# Patient Record
Sex: FEMALE | Race: WHITE | Employment: UNEMPLOYED | URBAN - METROPOLITAN AREA
[De-identification: names, ages, dates, MRNs, and addresses within clinical notes are randomized per-mention and may not be internally consistent; named-entity substitution may affect disease eponyms.]

---

## 2017-11-02 ENCOUNTER — HOSPITAL ENCOUNTER (EMERGENCY)
Age: 57
Discharge: HOME | End: 2017-11-02
Attending: EMERGENCY MEDICINE
Payer: COMMERCIAL

## 2017-11-02 ENCOUNTER — APPOINTMENT (OUTPATIENT)
Dept: GENERAL RADIOLOGY | Age: 57
End: 2017-11-02
Attending: PHYSICIAN ASSISTANT
Payer: COMMERCIAL

## 2017-11-02 VITALS
OXYGEN SATURATION: 100 % | BODY MASS INDEX: 26.52 KG/M2 | HEIGHT: 66 IN | RESPIRATION RATE: 16 BRPM | DIASTOLIC BLOOD PRESSURE: 82 MMHG | WEIGHT: 165 LBS | TEMPERATURE: 97.6 F | HEART RATE: 73 BPM | SYSTOLIC BLOOD PRESSURE: 157 MMHG

## 2017-11-02 DIAGNOSIS — S20.212A CONTUSION OF RIB ON LEFT SIDE, INITIAL ENCOUNTER: Primary | ICD-10-CM

## 2017-11-02 DIAGNOSIS — N39.0 URINARY TRACT INFECTION WITHOUT HEMATURIA, SITE UNSPECIFIED: ICD-10-CM

## 2017-11-02 LAB
APPEARANCE UR: ABNORMAL
BACTERIA URNS QL MICRO: ABNORMAL /HPF
BILIRUB UR QL: NEGATIVE
COLOR UR: YELLOW
EPITH CASTS URNS QL MICRO: ABNORMAL /LPF (ref 0–5)
GLUCOSE UR STRIP.AUTO-MCNC: NEGATIVE MG/DL
HGB UR QL STRIP: NEGATIVE
KETONES UR QL STRIP.AUTO: NEGATIVE MG/DL
LEUKOCYTE ESTERASE UR QL STRIP.AUTO: ABNORMAL
NITRITE UR QL STRIP.AUTO: POSITIVE
PH UR STRIP: 6 [PH] (ref 5–8)
PROT UR STRIP-MCNC: NEGATIVE MG/DL
RBC #/AREA URNS HPF: NEGATIVE /HPF (ref 0–5)
SP GR UR REFRACTOMETRY: 1.02 (ref 1–1.03)
UROBILINOGEN UR QL STRIP.AUTO: 1 EU/DL (ref 0.2–1)
WBC URNS QL MICRO: ABNORMAL /HPF (ref 0–5)

## 2017-11-02 PROCEDURE — 87186 SC STD MICRODIL/AGAR DIL: CPT | Performed by: PHYSICIAN ASSISTANT

## 2017-11-02 PROCEDURE — 87077 CULTURE AEROBIC IDENTIFY: CPT | Performed by: PHYSICIAN ASSISTANT

## 2017-11-02 PROCEDURE — 87086 URINE CULTURE/COLONY COUNT: CPT | Performed by: PHYSICIAN ASSISTANT

## 2017-11-02 PROCEDURE — 71101 X-RAY EXAM UNILAT RIBS/CHEST: CPT

## 2017-11-02 PROCEDURE — 99283 EMERGENCY DEPT VISIT LOW MDM: CPT

## 2017-11-02 PROCEDURE — 81001 URINALYSIS AUTO W/SCOPE: CPT | Performed by: PHYSICIAN ASSISTANT

## 2017-11-02 RX ORDER — OXYCODONE HYDROCHLORIDE 5 MG/1
5 CAPSULE ORAL
COMMUNITY
End: 2018-09-04

## 2017-11-02 RX ORDER — PHENAZOPYRIDINE HYDROCHLORIDE 200 MG/1
200 TABLET, FILM COATED ORAL 3 TIMES DAILY
Qty: 6 TAB | Refills: 0 | Status: SHIPPED | OUTPATIENT
Start: 2017-11-02 | End: 2017-11-04

## 2017-11-02 RX ORDER — SULFAMETHOXAZOLE AND TRIMETHOPRIM 800; 160 MG/1; MG/1
1 TABLET ORAL 2 TIMES DAILY
Qty: 6 TAB | Refills: 0 | Status: SHIPPED | OUTPATIENT
Start: 2017-11-02 | End: 2017-11-05

## 2017-11-02 RX ORDER — CYCLOBENZAPRINE HCL 5 MG
5 TABLET ORAL
Qty: 15 TAB | Refills: 0 | Status: SHIPPED | OUTPATIENT
Start: 2017-11-02 | End: 2018-09-04

## 2017-11-02 RX ORDER — IBUPROFEN 800 MG/1
800 TABLET ORAL
Qty: 20 TAB | Refills: 0 | Status: SHIPPED | OUTPATIENT
Start: 2017-11-02 | End: 2017-11-09

## 2017-11-02 NOTE — ED TRIAGE NOTES
Patient states did this before. States she fell asleep out of chair and she hit her knee and then she hit her back.  States this happened last night

## 2017-11-02 NOTE — ED PROVIDER NOTES
HPI Comments: 8:08 PM   April Gilmer is a 62 y.o. female presenting to the ED C/O an episode of \"falling asleep\" while sitting up in a chair, associated with a concurrent fall that resulted in bilateral knee pain, and head pain. Pt. States that this has happened once before that \"banged her up pretty bad\", but she states this one was worse. The patient has taken an Oxycodone this morning with little relief to the Sx. Pt denies fevers, chills, hitting her head, abdominal pain, and any other symptoms or complaints. Written by WENDY Monique, as dictated by Shar Sparrow PA-C      Patient is a 62 y.o. female presenting with fall. The history is provided by the patient. No  was used. Fall   The accident occurred 12 to 24 hours ago (lastnight). She fell from a height of 3 - 5 ft. Pertinent negatives include no fever and no abdominal pain. Past Medical History:   Diagnosis Date    Arrhythmia     hx of afib-due to stress and history of tachycardia    Arthritis     osteo-both hands, back    CAD (coronary artery disease)     Pt reports that in the past EKG shows that she had a heart attack-but all testing was neg    Chronic pain     lower back    Narcolepsy     Nausea & vomiting     Restless legs syndrome        Past Surgical History:   Procedure Laterality Date    HX  SECTION      x 2    HX GASTRIC BYPASS      gastric sleeve    HX GYN      ectopic preg    HX OTHER SURGICAL      gastric sleeve    HX SEPTOPLASTY      HX TUBAL LIGATION      NEUROLOGICAL PROCEDURE UNLISTED      back surgery x 2    NEUROLOGICAL PROCEDURE UNLISTED      neck surgery         Family History:   Problem Relation Age of Onset    Family history unknown: Yes       Social History     Social History    Marital status:      Spouse name: N/A    Number of children: N/A    Years of education: N/A     Occupational History    Not on file.      Social History Main Topics    Smoking status: Former Smoker     Quit date: 1/25/2004    Smokeless tobacco: Never Used    Alcohol use 4.8 oz/week     1 Shots of liquor, 7 Glasses of wine per week      Comment: daily    Drug use: No    Sexual activity: No     Other Topics Concern    Not on file     Social History Narrative         ALLERGIES: Keflex [cephalexin]    Review of Systems   Constitutional: Negative for chills and fever. Gastrointestinal: Negative for abdominal pain. Musculoskeletal: Positive for back pain. Bilateral knee pain     All other systems reviewed and are negative. Vitals:    11/02/17 1854   BP: 157/82   Pulse: 73   Resp: 16   Temp: 97.6 °F (36.4 °C)   SpO2: 100%   Weight: 74.8 kg (165 lb)   Height: 5' 6\" (1.676 m)            Physical Exam   Constitutional: She is oriented to person, place, and time. She appears well-developed and well-nourished. No distress. Sitting up on stretcher, appears tired    HENT:   Head: Normocephalic and atraumatic. Neck: Normal range of motion. Neck supple. Cardiovascular: Normal rate, regular rhythm, normal heart sounds and intact distal pulses. No murmur heard. Pulmonary/Chest: Effort normal and breath sounds normal. No accessory muscle usage. No respiratory distress. She has no decreased breath sounds. She has no wheezes. She has no rhonchi. She has no rales. She exhibits tenderness. She exhibits no mass, no bony tenderness, no laceration, no crepitus, no edema, no deformity, no swelling and no retraction. Abdominal: Soft. Bowel sounds are normal. She exhibits no distension. There is no hepatosplenomegaly. There is no tenderness. There is no rigidity, no rebound, no guarding and no CVA tenderness. Neurological: She is alert and oriented to person, place, and time. Skin: Skin is warm and dry. No rash noted. No erythema. No pallor. Psychiatric: She has a normal mood and affect. Judgment normal.   Nursing note and vitals reviewed.      RESULTS:    CARDIAC MONITOR NOTE:  Cardiac Rhythm: NSR  Rate: 73 bpm     PULSE OXIMETRY NOTE:  Pulse-ox is 100% on room air  Interpretation: normal       XR RIBS LT W PA CXR MIN 3 V   Final Result      Question of left second rib fracture correlate clinically with site of  tenderness.     No pneumothorax or acute process  As read by the radiologist.      Labs Reviewed   URINALYSIS W/ RFLX MICROSCOPIC - Abnormal; Notable for the following:        Result Value    Nitrites POSITIVE (*)     Leukocyte Esterase MODERATE (*)     All other components within normal limits   URINE MICROSCOPIC ONLY - Abnormal; Notable for the following:     Bacteria 4+ (*)     All other components within normal limits   CULTURE, URINE       Recent Results (from the past 12 hour(s))   URINALYSIS W/ RFLX MICROSCOPIC    Collection Time: 11/02/17  8:15 PM   Result Value Ref Range    Color YELLOW      Appearance CLOUDY      Specific gravity 1.023 1.005 - 1.030      pH (UA) 6.0 5.0 - 8.0      Protein NEGATIVE  NEG mg/dL    Glucose NEGATIVE  NEG mg/dL    Ketone NEGATIVE  NEG mg/dL    Bilirubin NEGATIVE  NEG      Blood NEGATIVE  NEG      Urobilinogen 1.0 0.2 - 1.0 EU/dL    Nitrites POSITIVE (A) NEG      Leukocyte Esterase MODERATE (A) NEG     URINE MICROSCOPIC ONLY    Collection Time: 11/02/17  8:15 PM   Result Value Ref Range    WBC 30 to 40 0 - 5 /hpf    RBC NEGATIVE  0 - 5 /hpf    Epithelial cells FEW 0 - 5 /lpf    Bacteria 4+ (A) NEG /hpf        MDM  Number of Diagnoses or Management Options  Contusion of rib on left side, initial encounter:   Urinary tract infection without hematuria, site unspecified:   Diagnosis management comments: Rib fx, contusion, ptx, kidney injury     ED Course     Medications - No data to display   Procedures      PROGRESS NOTE:  8:08 Pm  Initial assessment performed. Written by Oniel Mina, ED Scribe, as dictated by Emerita Milian PA-C     DISCUSSION:  Informed opt of all results.  No pain over the 2nd rib. sts she has had urinary sxs but never sought tx. Now c/o left sided neck pain, mild spasm of muscles, no midline tenderness, no crepitus or step off. sts she thinks its just stiff. No imaging indicated at this time. Non toxic appearance, afebrile. Will tx with Bactrim pending urine culture. Advised to use incentive spirometer that she has at home. DISCHARGE NOTE:  11:37 PM   Ca Rodriguez's  results have been reviewed with her. She has been counseled regarding her diagnosis, treatment, and plan. She verbally conveys understanding and agreement of the signs, symptoms, diagnosis, treatment and prognosis and additionally agrees to follow up as discussed. She also agrees with the care-plan and conveys that all of her questions have been answered. I have also provided discharge instructions for her that include: educational information regarding their diagnosis and treatment, and list of reasons why they would want to return to the ED prior to their follow-up appointment, should her condition change. The patient and/or family has been provided with education for proper Emergency Department utilization. CLINICAL IMPRESSION:    1. Contusion of rib on left side, initial encounter    2. Urinary tract infection without hematuria, site unspecified        PLAN: DISCHARGE HOME    Follow-up Information     Follow up With Details Comments Contact Info    Romana Jurado MD Schedule an appointment as soon as possible for a visit in 2 days ED Follow-up Audra Garcia  [phone] [facility] EMERGENCY DEPT Go to As needed, If symptoms worsen [address] Makenzie Schmidt  [phone]          Discharge Medication List as of 11/2/2017 11:37 PM      START taking these medications    Details   trimethoprim-sulfamethoxazole (BACTRIM DS) 160-800 mg per tablet Take 1 Tab by mouth two (2) times a day for 3 days. , Normal, Disp-6 Tab, R-0      phenazopyridine (PYRIDIUM) 200 mg tablet Take 1 Tab by mouth three (3) times daily for 2 days., Normal, Disp-6 Tab, R-0      ibuprofen (MOTRIN) 800 mg tablet Take 1 Tab by mouth every six (6) hours as needed for Pain for up to 7 days. , Normal, Disp-20 Tab, R-0      cyclobenzaprine (FLEXERIL) 5 mg tablet Take 1 Tab by mouth three (3) times daily as needed for Muscle Spasm(s). , Normal, Disp-15 Tab, R-0         CONTINUE these medications which have NOT CHANGED    Details   oxyCODONE (OXYIR) 5 mg capsule Take 5 mg by mouth every four (4) hours as needed., Historical Med      ROPINIROLE HCL (REQUIP PO) Take 12 mg by mouth daily. Indications: restless leg syndrome, Historical Med             ATTESTATIONS:  This note is prepared by Richie Ruff, acting as Scribe for Connie Toscano PA-C. Connie Toscano PA-C: The scribe's documentation has been prepared under my direction and personally reviewed by me in its entirety. I confirm that the note above accurately reflects all work, treatment, procedures, and medical decision making performed by me.

## 2017-11-03 NOTE — DISCHARGE INSTRUCTIONS
Chest Contusion: Care Instructions  Your Care Instructions  A chest contusion, or bruise, is caused by a fall or direct blow to the chest. Car crashes, falls, getting punched, and injury from bicycle handlebars are common causes of chest contusions. A very forceful blow to the chest can injure the heart or blood vessels in the chest, the lungs, the airway, the liver, or the spleen. Pain may be caused by an injury to muscles, cartilage, or ribs. Deep breathing, coughing, or sneezing can increase your pain. Lying on the injured area also can cause pain. Follow-up care is a key part of your treatment and safety. Be sure to make and go to all appointments, and call your doctor if you are having problems. It's also a good idea to know your test results and keep a list of the medicines you take. How can you care for yourself at home? · Rest and protect the injured or sore area. Stop, change, or take a break from any activity that may be causing your pain. · Put ice or a cold pack on the area for 10 to 20 minutes at a time. Put a thin cloth between the ice and your skin. · After 2 or 3 days, if your swelling is gone, apply a heating pad set on low or a warm cloth to your chest. Some doctors suggest that you go back and forth between hot and cold. Put a thin cloth between the heating pad and your skin. · Do not wrap or tape your ribs for support. This may cause you to take smaller breaths, which could increase your risk of pneumonia and lung collapse. · Ask your doctor if you can take an over-the-counter pain medicine, such as acetaminophen (Tylenol), ibuprofen (Advil, Motrin), or naproxen (Aleve). Be safe with medicines. Read and follow all instructions on the label. · Take your medicines exactly as prescribed. Call your doctor if you think you are having a problem with your medicine.   · Gentle stretching and massage may help you feel better after a few days of rest. Stretch slowly to the point just before discomfort begins, then hold the stretch for at least 15 to 30 seconds. Do this 3 or 4 times per day. · As your pain gets better, slowly return to your normal activities. Be patient, because chest bruises can take weeks or months to heal. Any increased pain may be a sign that you need to rest a while longer. When should you call for help? Call 911 anytime you think you may need emergency care. For example, call if:  ? · You have severe trouble breathing. ? · You cough up blood. ?Call your doctor now or seek immediate medical care if:  ? · You have belly pain. ? · You are dizzy or lightheaded, or you feel like you may faint. ? · You develop new symptoms with the chest pain. ? · Your chest pain gets worse. ? · You have a fever. ? · You have some shortness of breath. ? · You have a cough that brings up mucus from the lungs. ? Watch closely for changes in your health, and be sure to contact your doctor if:  ? · Your chest pain is not improving after 1 week. Where can you learn more? Go to http://janiya-jessika.info/. Enter I174 in the search box to learn more about \"Chest Contusion: Care Instructions. \"  Current as of: March 20, 2017  Content Version: 11.4  © 3921-6909 [employer]. Care instructions adapted under license by [employer] (which disclaims liability or warranty for this information). If you have questions about a medical condition or this instruction, always ask your healthcare professional. [place] [address] any warranty or liability for your use of this information. Urinary Tract Infection in Women: Care Instructions  Your Care Instructions    A urinary tract infection, or UTI, is a general term for an infection anywhere between the kidneys and the urethra (where urine comes out). Most UTIs are bladder infections. They often cause pain or burning when you urinate.   UTIs are caused by bacteria and can be cured with antibiotics. Be sure to complete your treatment so that the infection goes away. Follow-up care is a key part of your treatment and safety. Be sure to make and go to all appointments, and call your doctor if you are having problems. It's also a good idea to know your test results and keep a list of the medicines you take. How can you care for yourself at home? · Take your antibiotics as directed. Do not stop taking them just because you feel better. You need to take the full course of antibiotics. · Drink extra water and other fluids for the next day or two. This may help wash out the bacteria that are causing the infection. (If you have kidney, heart, or liver disease and have to limit fluids, talk with your doctor before you increase your fluid intake.)  · Avoid drinks that are carbonated or have caffeine. They can irritate the bladder. · Urinate often. Try to empty your bladder each time. · To relieve pain, take a hot bath or lay a heating pad set on low over your lower belly or genital area. Never go to sleep with a heating pad in place. To prevent UTIs  · Drink plenty of water each day. This helps you urinate often, which clears bacteria from your system. (If you have kidney, heart, or liver disease and have to limit fluids, talk with your doctor before you increase your fluid intake.)  · Urinate when you need to. · Urinate right after you have sex. · Change sanitary pads often. · Avoid douches, bubble baths, feminine hygiene sprays, and other feminine hygiene products that have deodorants. · After going to the bathroom, wipe from front to back. When should you call for help? Call your doctor now or seek immediate medical care if:  ? · Symptoms such as fever, chills, nausea, or vomiting get worse or appear for the first time. ? · You have new pain in your back just below your rib cage. This is called flank pain. ? · There is new blood or pus in your urine.    ? · You have any problems with your antibiotic medicine. ? Watch closely for changes in your health, and be sure to contact your doctor if:  ? · You are not getting better after taking an antibiotic for 2 days. ? · Your symptoms go away but then come back. Where can you learn more? Go to http://janiya-jessika.info/. Enter J636 in the search box to learn more about \"Urinary Tract Infection in Women: Care Instructions. \"  Current as of: May 12, 2017  Content Version: 11.4  © 5044-0720 "[employer]". Care instructions adapted under license by [employer] (which disclaims liability or warranty for this information). If you have questions about a medical condition or this instruction, always ask your healthcare professional. [address] any warranty or liability for your use of this information.

## 2017-11-05 LAB
BACTERIA SPEC CULT: ABNORMAL
SERVICE CMNT-IMP: ABNORMAL

## 2018-09-04 RX ORDER — DEXTROAMPHETAMINE SACCHARATE, AMPHETAMINE ASPARTATE, DEXTROAMPHETAMINE SULFATE AND AMPHETAMINE SULFATE 7.5; 7.5; 7.5; 7.5 MG/1; MG/1; MG/1; MG/1
30 TABLET ORAL
COMMUNITY

## 2018-09-04 RX ORDER — OMEPRAZOLE 20 MG/1
20 CAPSULE, DELAYED RELEASE ORAL DAILY
COMMUNITY

## 2018-09-04 NOTE — PROGRESS NOTES
PT aware of need to hold anticoagulants per protocol. Pt denies. PT aware of need for  at discharge. PT aware of arrival time pre procedure. Arrive at [facility] radiology waiting room on 9/14/18 at 1000 for scheduled procedure to occur at 1100. Pt states no questions at this time. Gave pt [facility] radiology rn phone number [phone].

## 2018-09-14 ENCOUNTER — HOSPITAL ENCOUNTER (OUTPATIENT)
Dept: GENERAL RADIOLOGY | Age: 58
Discharge: HOME | End: 2018-09-14
Attending: ORTHOPAEDIC SURGERY

## 2018-09-28 NOTE — PROGRESS NOTES
PT aware of need for  at discharge. PT aware of arrival time pre procedure. Pt states no questions at this time. Number left for any additional questions.

## 2018-10-04 ENCOUNTER — HOSPITAL ENCOUNTER (OUTPATIENT)
Dept: GENERAL RADIOLOGY | Age: 58
Discharge: HOME | End: 2018-10-04
Attending: RADIOLOGY | Admitting: RADIOLOGY
Payer: COMMERCIAL

## 2018-10-04 ENCOUNTER — APPOINTMENT (OUTPATIENT)
Dept: CT IMAGING | Age: 58
End: 2018-10-04
Attending: ORTHOPAEDIC SURGERY
Payer: COMMERCIAL

## 2018-10-04 VITALS
RESPIRATION RATE: 20 BRPM | SYSTOLIC BLOOD PRESSURE: 104 MMHG | BODY MASS INDEX: 28.25 KG/M2 | HEART RATE: 60 BPM | DIASTOLIC BLOOD PRESSURE: 57 MMHG | HEIGHT: 67 IN | OXYGEN SATURATION: 97 % | WEIGHT: 180 LBS | TEMPERATURE: 97.4 F

## 2018-10-04 DIAGNOSIS — M54.50 LOW BACK PAIN: ICD-10-CM

## 2018-10-04 PROCEDURE — 62304 MYELOGRAPHY LUMBAR INJECTION: CPT

## 2018-10-04 PROCEDURE — 72132 CT LUMBAR SPINE W/DYE: CPT

## 2018-10-04 PROCEDURE — 74011636320 HC RX REV CODE- 636/320: Performed by: RADIOLOGY

## 2018-10-04 PROCEDURE — 74011250636 HC RX REV CODE- 250/636

## 2018-10-04 PROCEDURE — 74011250637 HC RX REV CODE- 250/637: Performed by: RADIOLOGY

## 2018-10-04 RX ORDER — HYDROCODONE BITARTRATE AND ACETAMINOPHEN 5; 325 MG/1; MG/1
2 TABLET ORAL
Status: DISCONTINUED | OUTPATIENT
Start: 2018-10-04 | End: 2018-10-04 | Stop reason: HOSPADM

## 2018-10-04 RX ORDER — LIDOCAINE HYDROCHLORIDE 10 MG/ML
INJECTION, SOLUTION EPIDURAL; INFILTRATION; INTRACAUDAL; PERINEURAL
Status: COMPLETED
Start: 2018-10-04 | End: 2018-10-04

## 2018-10-04 RX ORDER — LIDOCAINE HYDROCHLORIDE 10 MG/ML
1-10 INJECTION, SOLUTION EPIDURAL; INFILTRATION; INTRACAUDAL; PERINEURAL
Status: COMPLETED | OUTPATIENT
Start: 2018-10-04 | End: 2018-10-04

## 2018-10-04 RX ORDER — ACETAMINOPHEN 325 MG/1
650 TABLET ORAL
Status: DISCONTINUED | OUTPATIENT
Start: 2018-10-04 | End: 2018-10-04 | Stop reason: HOSPADM

## 2018-10-04 RX ORDER — AMMONIA 15 % (W/V)
1 AMPUL (EA) INHALATION
Status: DISCONTINUED | OUTPATIENT
Start: 2018-10-04 | End: 2018-10-04 | Stop reason: HOSPADM

## 2018-10-04 RX ADMIN — LIDOCAINE HYDROCHLORIDE 5 ML: 10 INJECTION, SOLUTION EPIDURAL; INFILTRATION; INTRACAUDAL; PERINEURAL at 11:59

## 2018-10-04 RX ADMIN — IOPAMIDOL 17 ML: 408 INJECTION, SOLUTION INTRATHECAL at 12:00

## 2018-10-04 RX ADMIN — HYDROCODONE BITARTRATE AND ACETAMINOPHEN 2 TABLET: 5; 325 TABLET ORAL at 13:27

## 2018-10-04 NOTE — H&P
..      The patient is an appropriate candidate to undergo LP for Ct Myelogram      History and Physical update:  H&P was reviewed and the patient was evaluated. No changes have occurred in the patient's condition since the H&P was completed.     Jolanta Varghese MD  Vascular & Interventional Radiology  [facility]  10/4/2018

## 2018-10-04 NOTE — PROGRESS NOTES
Pt is all prepped and ready for procedure. 1 Pt had an uneventful recovery, was medicated @   1330 for c/o HA 5/10, two percocets was adm and was effective. Pt slept well the rest of the recovery time. Discharge instructions reviewed and understandings verbalized well. Pt escorted to car via W/C and left with spouse in stabble condition with no complains voiced.

## 2018-10-04 NOTE — IP AVS SNAPSHOT
[facility] 
 
 
 [address] [address] 
[phone] Patient: Kristyn Godinez MRN: [id number] GW About your hospitalization You were admitted on:  2018 You last received care in the:  [address] You were discharged on:  2018 Why you were hospitalized Your primary diagnosis was:  Not on File Follow-up Information Follow up With Details Comments Contact Info Laura Sullivan MD   [address] 
[place] [address] 
[phone] Discharge Orders None A check erich indicates which time of day the medication should be taken. My Medications ASK your doctor about these medications Instructions Each Dose to Equal  
 Morning Noon Evening Bedtime ADDERALL 30 mg tablet Generic drug:  dextroamphetamine-amphetamine Your last dose was: Your next dose is: Take 30 mg by mouth. 30 mg  
    
   
   
   
  
 omeprazole 20 mg capsule Commonly known as:  PRILOSEC Your last dose was: Your next dose is: Take 20 mg by mouth daily. 20 mg  
    
   
   
   
  
 REQUIP PO Your last dose was: Your next dose is: Take 12 mg by mouth daily. Indications: restless leg syndrome 12 mg Discharge Instructions Back Pain: Care Instructions Your Care Instructions Back pain has many possible causes. It is often related to problems with muscles and ligaments of the back. It may also be related to problems with the nerves, discs, or bones of the back. Moving, lifting, standing, sitting, or sleeping in an awkward way can strain the back. Sometimes you don't notice the injury until later. Arthritis is another common cause of back pain.  
Although it may hurt a lot, back pain usually improves on its own within several weeks. Most people recover in 12 weeks or less. Using good home treatment and being careful not to stress your back can help you feel better sooner. Follow-up care is a key part of your treatment and safety. Be sure to make and go to all appointments, and call your doctor if you are having problems. It's also a good idea to know your test results and keep a list of the medicines you take. How can you care for yourself at home? · Sit or lie in positions that are most comfortable and reduce your pain. Try one of these positions when you lie down: ¨ Lie on your back with your knees bent and supported by large pillows. ¨ Lie on the floor with your legs on the seat of a sofa or chair. Roney Ek on your side with your knees and hips bent and a pillow between your legs. ¨ Lie on your stomach if it does not make pain worse. · Do not sit up in bed, and avoid soft couches and twisted positions. Bed rest can help relieve pain at first, but it delays healing. Avoid bed rest after the first day of back pain. · Change positions every 30 minutes. If you must sit for long periods of time, take breaks from sitting. Get up and walk around, or lie in a comfortable position. · Try using a heating pad on a low or medium setting for 15 to 20 minutes every 2 or 3 hours. Try a warm shower in place of one session with the heating pad. · You can also try an ice pack for 10 to 15 minutes every 2 to 3 hours. Put a thin cloth between the ice pack and your skin. · Take pain medicines exactly as directed. ¨ If the doctor gave you a prescription medicine for pain, take it as prescribed. ¨ If you are not taking a prescription pain medicine, ask your doctor if you can take an over-the-counter medicine. · Take short walks several times a day. You can start with 5 to 10 minutes, 3 or 4 times a day, and work up to longer walks. Walk on level surfaces and avoid hills and stairs until your back is better. · Return to work and other activities as soon as you can. Continued rest without activity is usually not good for your back. · To prevent future back pain, do exercises to stretch and strengthen your back and stomach. Learn how to use good posture, safe lifting techniques, and proper body mechanics. When should you call for help? Call your doctor now or seek immediate medical care if: 
  · You have new or worsening numbness in your legs.  
  · You have new or worsening weakness in your legs. (This could make it hard to stand up.)  
  · You lose control of your bladder or bowels.  
 Watch closely for changes in your health, and be sure to contact your doctor if: 
  · You have a fever, lose weight, or don't feel well.  
  · You do not get better as expected. Where can you learn more? Go to http://janiya-jessika.info/. Enter S730 in the search box to learn more about \"Back Pain: Care Instructions. \" Current as of: November 29, 2017 Content Version: 11.8 © 1284-1873 [employer]. Care instructions adapted under license by [employer] (which disclaims liability or warranty for this information). If you have questions about a medical condition or this instruction, always ask your healthcare professional. [place] [address] any warranty or liability for your use of this information. Myelogram: About This Test 
What is it? A myelogram uses X-rays and a special dye to make pictures of bones and nerves of the spine (spinal canal). The spinal canal holds the spinal cord, the spinal nerve roots, and the fluid-filled space between the bones in your spine. Why is this test done? A myelogram is done to check for: · The cause of arm or leg numbness, weakness, or pain. · Narrowing of the spinal canal (spinal stenosis). · A tumor or infection causing problems with the spinal cord or nerve roots. · A spinal disc that has ruptured (herniated disc). · Inflammation of the membrane that covers the brain and spinal cord. · Problems with the blood vessels to the spine. How can you prepare for the test? 
Your doctor will tell you if you need to change how much you eat and drink before the myelogram. You may be asked to increase the amount of water you drink before the test. Follow the instructions your doctor gives you about eating and drinking. Before a myelogram, tell your doctor if: 
· You are allergic to any medicines, contrast material, or iodine dye. · You have had bleeding problems, or you take a blood thinner, such as aspirin, clopidogrel (Plavix), or warfarin (Coumadin), or you take over-the-counter medicines, such as ibuprofen (Advil, Motrin) or naproxen (Aleve). Your doctor will tell you when you should stop taking these medicines several days before your procedure. Make sure that you understand exactly what he or she wants you to do. · You have had kidney problems. · You have diabetes, especially if you take metformin (Glucophage, for example). · You are or might be pregnant. Ask someone to take you home and stay with you after the test. 
What happens during the test? 
The dye is put into your spinal canal with a thin needle. This is called a lumbar puncture. The dye moves through the space so the nerve roots and spinal cord can be seen more clearly. After the dye is put in, you will lie still while the X-ray pictures are taken. How does it feel? You will feel a quick sting from a small needle that has medicine to numb the skin on your back. You will also feel some pressure as the long, thin spinal needle is put into your spinal canal. You may feel a quick sharp pain down your buttock or leg when the needle is moved in your spine. The dye may make you feel warm and flushed and have a metallic taste in your mouth.  
What else should you know about the test? 
In rare cases, the hole made by the needle in the sac around the spine does not close normally. This can allow spinal fluid to leak out. This leak may need to be repaired through a procedure called an epidural blood patch. To do the patch, your doctor injects some of your own blood to cover the hole. How long does the test take? · A myelogram usually takes 30 minutes to 1 hour. What happens after the test? 
· You will probably be able to go home 30 minutes to 2 hours after the test. 
· You may need to lie in bed with your head raised for 4 to 24 hours after the test. To prevent seizures, which are a rare side effect, do not bend over or lie down with your head lower than your body. Keeping your head higher than your body after a myelogram also may help prevent or reduce other side effects, such as headache, nausea, or vomiting. · Avoid strenuous activity, such as running or heavy lifting, for at least 1 day after your myelogram. 
· Drink plenty of water after the myelogram. Your doctor will give you instructions on taking your regular medicines. When should you call for help? Call 911 anytime you think you may need emergency care. For example, call if: 
· You have a seizure. Call your doctor now or seek immediate medical care if: 
· You have any increase in pain, weakness, or numbness in your legs. · You have a severe headache or stiff neck, or your eyes become very sensitive to light. · You have a headache that lasts longer than 24 hours. · You have problems urinating or having a bowel movement. · You have a fever. Follow-up care is a key part of your treatment and safety. Be sure to make and go to all appointments, and call your doctor if you are having problems. It's also a good idea to keep a list of the medicines you take. Ask your doctor when you can expect to have your test results. Where can you learn more? Go to http://janiya-jessika.info/.  
Enter X008 in the search box to learn more about \"Myelogram: About This Test.\" 
 Current as of: June 26, 2018 Content Version: 11.8 © 7271-4217 [employer]. Care instructions adapted under license by [employer] (which disclaims liability or warranty for this information). If you have questions about a medical condition or this instruction, always ask your healthcare professional. [address] any warranty or liability for your use of this information. DISCHARGE SUMMARY from Nurse PATIENT INSTRUCTIONS: 
 
 
F-face looks uneven A-arms unable to move or move unevenly S-speech slurred or non-existent T-time-call 911 as soon as signs and symptoms begin-DO NOT go Back to bed or wait to see if you get better-TIME IS BRAIN. Warning Signs of HEART ATTACK Call 911 if you have these symptoms: 
? Chest discomfort. Most heart attacks involve discomfort in the center of the chest that lasts more than a few minutes, or that goes away and comes back. It can feel like uncomfortable pressure, squeezing, fullness, or pain. ? Discomfort in other areas of the upper body. Symptoms can include pain or discomfort in one or both arms, the back, neck, jaw, or stomach. ? Shortness of breath with or without chest discomfort. ? Other signs may include breaking out in a cold sweat, nausea, or lightheadedness. Don't wait more than five minutes to call [address]! Fast action can save your life. Calling 911 is almost always the fastest way to get lifesaving treatment. Emergency Medical Services staff can begin treatment when they arrive  up to an hour sooner than if someone gets to the hospital by car. The discharge information has been reviewed with the patient and spouse. The patient and spouse verbalized understanding. Discharge medications reviewed with the patient and spouse and appropriate educational materials and side effects teaching were provided. Patient armband removed and shredded 
___________________________________________________________________________________________________________________________________ Introducing [facility] [place] [facility]! Dear Lars Seek: 
Thank you for requesting a [employer] account. Our records indicate that you already have an active [employer] account. You can access your account anytime at [email or web address] Did you know that you can access your hospital and ER discharge instructions at any time in [employer]? You can also review all of your test results from your hospital stay or ER visit. Additional Information If you have questions, please visit the Frequently Asked Questions section of the [employer] website at [email or web address] Remember, [employer] is NOT to be used for urgent needs. For medical emergencies, dial 911. Now available from your iPhone and Android! Introducing Harrison Red As a [employer] patient, I wanted to make you aware of our electronic visit tool called Harrison CurranCloudwise. [employer] 24/7 allows you to connect within minutes with a medical provider 24 hours a day, seven days a week via a mobile device or tablet or logging into a secure website from your computer. You can access Harrison Davidbalbirfin from anywhere in the [place]. A virtual visit might be right for you when you have a simple condition and feel like you just dont want to get out of bed, or cant get away from work for an appointment, when your regular [employer] provider is not available (evenings, weekends or holidays), or when youre out of town and need minor care.   Electronic visits cost only $49 and if the Harrison Red provider determines a prescription is needed to treat your condition, one can be electronically transmitted to a nearby pharmacy*. Please take a moment to enroll today if you have not already done so. The enrollment process is free and takes just a few minutes. To enroll, please download the [employer] 24/7 izzy to your tablet or phone, or visit [email or web address] to enroll on your computer. And, as an [address] [place] [address] patient with a [employer] account, the results of your visits will be scanned into your electronic medical record and your primary care provider will be able to view the scanned results. We urge you to continue to see your regular [employer] provider for your ongoing medical care. And while your primary care provider may not be the one available when you seek a Crystal ISbalbirfin virtual visit, the peace of mind you get from getting a real diagnosis real time can be priceless. For more information on [employer], view our Frequently Asked Questions (FAQs) at [email or web address]. Sincerely, 
 
Liam Fofana MD 
Chief Medical Officer [address] [place] [address] *:  certain medications cannot be prescribed via [employer] Providers Seen During Your Hospitalization Provider Specialty Primary office phone Renetta Pride MD Diagnostic Radiology [phone] Your Primary Care Physician (PCP) Primary Care Physician Office Phone Office Fax Darren Alberto 121 [phone] You are allergic to the following Allergen Reactions Keflex (Cephalexin) Other (comments) Caused vasculitis Recent Documentation Height Weight Breastfeeding? BMI OB Status Smoking Status 1.689 m 81.6 kg No 28.62 kg/m2 Postmenopausal Former Smoker Emergency Contacts Name Discharge Info Relation Home Work Mobile [facility] DISCHARGE CAREGIVER [3] Spouse [3] [phone] Patient Belongings The following personal items are in your possession at time of discharge: 
     Visual Aid: None Please provide this summary of care documentation to your next provider. Signatures-by signing, you are acknowledging that this After Visit Summary has been reviewed with you and you have received a copy. Patient Signature:  ____________________________________________________________ Date:  ____________________________________________________________  
  
Marthann Sniff Provider Signature:  ____________________________________________________________ Date:  ____________________________________________________________

## 2018-10-04 NOTE — ROUTINE PROCESS
TRANSFER - OUT REPORT:    Verbal report given to Olya RN(name) on Latrice Fees  being transferred to CT scan(unit) for routine progression of care       Report consisted of patients Situation, Background, Assessment and   Recommendations(SBAR). Information from the following report(s) SBAR and Procedure Summary was reviewed with the receiving nurse. Opportunity for questions and clarification was provided.       Patient transported with:  Claire HALL(R) Tech

## 2018-10-04 NOTE — DISCHARGE INSTRUCTIONS
Back Pain: Care Instructions  Your Care Instructions    Back pain has many possible causes. It is often related to problems with muscles and ligaments of the back. It may also be related to problems with the nerves, discs, or bones of the back. Moving, lifting, standing, sitting, or sleeping in an awkward way can strain the back. Sometimes you don't notice the injury until later. Arthritis is another common cause of back pain. Although it may hurt a lot, back pain usually improves on its own within several weeks. Most people recover in 12 weeks or less. Using good home treatment and being careful not to stress your back can help you feel better sooner. Follow-up care is a key part of your treatment and safety. Be sure to make and go to all appointments, and call your doctor if you are having problems. It's also a good idea to know your test results and keep a list of the medicines you take. How can you care for yourself at home? · Sit or lie in positions that are most comfortable and reduce your pain. Try one of these positions when you lie down:  ¨ Lie on your back with your knees bent and supported by large pillows. ¨ Lie on the floor with your legs on the seat of a sofa or chair. [place] [address] on your side with your knees and hips bent and a pillow between your legs. ¨ Lie on your stomach if it does not make pain worse. · Do not sit up in bed, and avoid soft couches and twisted positions. Bed rest can help relieve pain at first, but it delays healing. Avoid bed rest after the first day of back pain. · Change positions every 30 minutes. If you must sit for long periods of time, take breaks from sitting. Get up and walk around, or lie in a comfortable position. · Try using a heating pad on a low or medium setting for 15 to 20 minutes every 2 or 3 hours. Try a warm shower in place of one session with the heating pad. · You can also try an ice pack for 10 to 15 minutes every 2 to 3 hours.  Put a thin cloth between the ice pack and your skin. · Take pain medicines exactly as directed. ¨ If the doctor gave you a prescription medicine for pain, take it as prescribed. ¨ If you are not taking a prescription pain medicine, ask your doctor if you can take an over-the-counter medicine. · Take short walks several times a day. You can start with 5 to 10 minutes, 3 or 4 times a day, and work up to longer walks. Walk on level surfaces and avoid hills and stairs until your back is better. · Return to work and other activities as soon as you can. Continued rest without activity is usually not good for your back. · To prevent future back pain, do exercises to stretch and strengthen your back and stomach. Learn how to use good posture, safe lifting techniques, and proper body mechanics. When should you call for help? Call your doctor now or seek immediate medical care if:    · You have new or worsening numbness in your legs.     · You have new or worsening weakness in your legs. (This could make it hard to stand up.)     · You lose control of your bladder or bowels.    Watch closely for changes in your health, and be sure to contact your doctor if:    · You have a fever, lose weight, or don't feel well.     · You do not get better as expected. Where can you learn more? Go to http://janiya-jessika.info/. Enter C209 in the search box to learn more about \"Back Pain: Care Instructions. \"  Current as of: November 29, 2017  Content Version: 11.8  © 8591-1898 [employer]. Care instructions adapted under license by [employer] (which disclaims liability or warranty for this information). If you have questions about a medical condition or this instruction, always ask your healthcare professional. [place] [address] any warranty or liability for your use of this information. Myelogram: About This Test  What is it?     A myelogram uses X-rays and a special dye to make pictures of bones and nerves of the spine (spinal canal). The spinal canal holds the spinal cord, the spinal nerve roots, and the fluid-filled space between the bones in your spine. Why is this test done? A myelogram is done to check for:  · The cause of arm or leg numbness, weakness, or pain. · Narrowing of the spinal canal (spinal stenosis). · A tumor or infection causing problems with the spinal cord or nerve roots. · A spinal disc that has ruptured (herniated disc). · Inflammation of the membrane that covers the brain and spinal cord. · Problems with the blood vessels to the spine. How can you prepare for the test?  Your doctor will tell you if you need to change how much you eat and drink before the myelogram. You may be asked to increase the amount of water you drink before the test. Follow the instructions your doctor gives you about eating and drinking. Before a myelogram, tell your doctor if:  · You are allergic to any medicines, contrast material, or iodine dye. · You have had bleeding problems, or you take a blood thinner, such as aspirin, clopidogrel (Plavix), or warfarin (Coumadin), or you take over-the-counter medicines, such as ibuprofen (Advil, Motrin) or naproxen (Aleve). Your doctor will tell you when you should stop taking these medicines several days before your procedure. Make sure that you understand exactly what he or she wants you to do. · You have had kidney problems. · You have diabetes, especially if you take metformin (Glucophage, for example). · You are or might be pregnant. Ask someone to take you home and stay with you after the test.  What happens during the test?  The dye is put into your spinal canal with a thin needle. This is called a lumbar puncture. The dye moves through the space so the nerve roots and spinal cord can be seen more clearly. After the dye is put in, you will lie still while the X-ray pictures are taken. How does it feel?   You will feel a quick sting from a small needle that has medicine to numb the skin on your back. You will also feel some pressure as the long, thin spinal needle is put into your spinal canal. You may feel a quick sharp pain down your buttock or leg when the needle is moved in your spine. The dye may make you feel warm and flushed and have a metallic taste in your mouth. What else should you know about the test?  In rare cases, the hole made by the needle in the sac around the spine does not close normally. This can allow spinal fluid to leak out. This leak may need to be repaired through a procedure called an epidural blood patch. To do the patch, your doctor injects some of your own blood to cover the hole. How long does the test take? · A myelogram usually takes 30 minutes to 1 hour. What happens after the test?  · You will probably be able to go home 30 minutes to 2 hours after the test.  · You may need to lie in bed with your head raised for 4 to 24 hours after the test. To prevent seizures, which are a rare side effect, do not bend over or lie down with your head lower than your body. Keeping your head higher than your body after a myelogram also may help prevent or reduce other side effects, such as headache, nausea, or vomiting. · Avoid strenuous activity, such as running or heavy lifting, for at least 1 day after your myelogram.  · Drink plenty of water after the myelogram. Your doctor will give you instructions on taking your regular medicines. When should you call for help? Call 911 anytime you think you may need emergency care. For example, call if:  · You have a seizure. Call your doctor now or seek immediate medical care if:  · You have any increase in pain, weakness, or numbness in your legs. · You have a severe headache or stiff neck, or your eyes become very sensitive to light. · You have a headache that lasts longer than 24 hours. · You have problems urinating or having a bowel movement.   · You have a fever.  Follow-up care is a key part of your treatment and safety. Be sure to make and go to all appointments, and call your doctor if you are having problems. It's also a good idea to keep a list of the medicines you take. Ask your doctor when you can expect to have your test results. Where can you learn more? Go to http://janiya-jessika.info/. Enter E936 in the search box to learn more about \"Myelogram: About This Test.\"  Current as of: June 26, 2018  Content Version: 11.8  © 2712-7427 [employer]. Care instructions adapted under license by [employer] (which disclaims liability or warranty for this information). If you have questions about a medical condition or this instruction, always ask your healthcare professional. [address] any warranty or liability for your use of this information. DISCHARGE SUMMARY from Nurse    PATIENT INSTRUCTIONS:    After general anesthesia or intravenous sedation, for 24 hours or while taking prescription Narcotics:  · Limit your activities  · Do not drive and operate hazardous machinery  · Do not make important personal or business decisions  · Do  not drink alcoholic beverages  · If you have not urinated within 8 hours after discharge, please contact your surgeon on call. Report the following to your surgeon:  · Excessive pain, swelling, redness or odor of or around the surgical area  · Temperature over 100.5  · Nausea and vomiting lasting longer than 4 hours or if unable to take medications  · Any signs of decreased circulation or nerve impairment to extremity: change in color, persistent  numbness, tingling, coldness or increase pain  · Any questions    What to do at Home:  Recommended activity: Activity as tolerated,       *  Please give a list of your current medications to your Primary Care Provider.     *  Please update this list whenever your medications are discontinued, doses are      changed, or new medications (including over-the-counter products) are added. *  Please carry medication information at all times in case of emergency situations. These are general instructions for a healthy lifestyle:    No smoking/ No tobacco products/ Avoid exposure to second hand smoke  Surgeon General's Warning:  Quitting smoking now greatly reduces serious risk to your health. Obesity, smoking, and sedentary lifestyle greatly increases your risk for illness    A healthy diet, regular physical exercise & weight monitoring are important for maintaining a healthy lifestyle    You may be retaining fluid if you have a history of heart failure or if you experience any of the following symptoms:  Weight gain of 3 pounds or more overnight or 5 pounds in a week, increased swelling in our hands or feet or shortness of breath while lying flat in bed. Please call your doctor as soon as you notice any of these symptoms; do not wait until your next office visit. Recognize signs and symptoms of STROKE:    F-face looks uneven    A-arms unable to move or move unevenly    S-speech slurred or non-existent    T-time-call 911 as soon as signs and symptoms begin-DO NOT go       Back to bed or wait to see if you get better-TIME IS BRAIN. Warning Signs of HEART ATTACK     Call 911 if you have these symptoms:   Chest discomfort. Most heart attacks involve discomfort in the center of the chest that lasts more than a few minutes, or that goes away and comes back. It can feel like uncomfortable pressure, squeezing, fullness, or pain.  Discomfort in other areas of the upper body. Symptoms can include pain or discomfort in one or both arms, the back, neck, jaw, or stomach.  Shortness of breath with or without chest discomfort.  Other signs may include breaking out in a cold sweat, nausea, or lightheadedness. Don't wait more than five minutes to call 911 - MINUTES MATTER! Fast action can save your life.  Calling 911 is almost always the fastest way to get lifesaving treatment. Emergency Medical Services staff can begin treatment when they arrive -- up to an hour sooner than if someone gets to the hospital by car. The discharge information has been reviewed with the patient and spouse. The patient and spouse verbalized understanding. Discharge medications reviewed with the patient and spouse and appropriate educational materials and side effects teaching were provided.     Patient armband removed and shredded  ___________________________________________________________________________________________________________________________________

## 2019-04-27 ENCOUNTER — HOSPITAL ENCOUNTER (EMERGENCY)
Age: 59
Discharge: STILL ADMITTED OR ADMINISTRATIVE | End: 2019-04-27
Attending: EMERGENCY MEDICINE
Payer: COMMERCIAL

## 2019-04-27 PROCEDURE — 75810000275 HC EMERGENCY DEPT VISIT NO LEVEL OF CARE

## 2019-08-06 RX ORDER — LANOLIN ALCOHOL/MO/W.PET/CERES
5000 CREAM (GRAM) TOPICAL DAILY
COMMUNITY

## 2019-08-06 RX ORDER — ACETAMINOPHEN 500 MG
5000 TABLET ORAL 2 TIMES DAILY
COMMUNITY

## 2019-08-06 NOTE — PROGRESS NOTES
Patient will hold Adderall for 48 hrs prior to procedure. PT aware of potential for sedation administration and need for  at discharge. PT aware of arrival time pre procedure. will arrive at 0800 for 0900 procedure. Pt states no questions at this time.

## 2019-08-08 ENCOUNTER — HOSPITAL ENCOUNTER (OUTPATIENT)
Dept: GENERAL RADIOLOGY | Age: 59
Discharge: HOME | End: 2019-08-08
Attending: PAIN MEDICINE | Admitting: PAIN MEDICINE
Payer: COMMERCIAL

## 2019-08-08 ENCOUNTER — HOSPITAL ENCOUNTER (OUTPATIENT)
Dept: CT IMAGING | Age: 59
Discharge: HOME | End: 2019-08-08
Attending: PAIN MEDICINE
Payer: COMMERCIAL

## 2019-08-08 VITALS
WEIGHT: 181.06 LBS | DIASTOLIC BLOOD PRESSURE: 62 MMHG | TEMPERATURE: 97.6 F | RESPIRATION RATE: 16 BRPM | HEART RATE: 55 BPM | BODY MASS INDEX: 29.1 KG/M2 | OXYGEN SATURATION: 96 % | SYSTOLIC BLOOD PRESSURE: 110 MMHG | HEIGHT: 66 IN

## 2019-08-08 DIAGNOSIS — M54.50 LOW BACK PAIN: ICD-10-CM

## 2019-08-08 PROCEDURE — 62304 MYELOGRAPHY LUMBAR INJECTION: CPT

## 2019-08-08 PROCEDURE — 74011250637 HC RX REV CODE- 250/637: Performed by: RADIOLOGY

## 2019-08-08 PROCEDURE — 74011636320 HC RX REV CODE- 636/320

## 2019-08-08 PROCEDURE — 72132 CT LUMBAR SPINE W/DYE: CPT

## 2019-08-08 RX ORDER — ACETAMINOPHEN 325 MG/1
650 TABLET ORAL
Status: DISCONTINUED | OUTPATIENT
Start: 2019-08-08 | End: 2019-08-08 | Stop reason: HOSPADM

## 2019-08-08 RX ORDER — LIDOCAINE HYDROCHLORIDE 10 MG/ML
INJECTION, SOLUTION EPIDURAL; INFILTRATION; INTRACAUDAL; PERINEURAL
Status: COMPLETED
Start: 2019-08-08 | End: 2019-08-08

## 2019-08-08 RX ORDER — HYDROCODONE BITARTRATE AND ACETAMINOPHEN 5; 325 MG/1; MG/1
1 TABLET ORAL
Status: DISCONTINUED | OUTPATIENT
Start: 2019-08-08 | End: 2019-08-08 | Stop reason: HOSPADM

## 2019-08-08 RX ORDER — LIDOCAINE HYDROCHLORIDE 10 MG/ML
1-10 INJECTION, SOLUTION EPIDURAL; INFILTRATION; INTRACAUDAL; PERINEURAL
Status: COMPLETED | OUTPATIENT
Start: 2019-08-08 | End: 2019-08-08

## 2019-08-08 RX ADMIN — HYDROCODONE BITARTRATE AND ACETAMINOPHEN 1 TABLET: 5; 325 TABLET ORAL at 13:52

## 2019-08-08 RX ADMIN — LIDOCAINE HYDROCHLORIDE 5 ML: 10 INJECTION, SOLUTION EPIDURAL; INFILTRATION; INTRACAUDAL; PERINEURAL at 09:42

## 2019-08-08 RX ADMIN — IOPAMIDOL 15 ML: 408 INJECTION, SOLUTION INTRATHECAL at 09:42

## 2019-08-08 NOTE — H&P
The patient is an appropriate candidate to undergo LP for Ct Myelogram        History and Physical update:  H&P was reviewed and the patient was evaluated. No changes have occurred in the patient's condition since the H&P was completed.     Jessica Moore MD  [facility]

## 2019-08-08 NOTE — PROGRESS NOTES
here. Pt woken, discharge inst reviewed with pt and . Both verbalize understanding pt states starting to get head ache, states has hx of cluster h/a. Pain med offered and accepted by pt monitored for 30 minutes then released in care of . wheelchair to car. Pt states headache gone.   Arm bands removed and shredded

## 2019-08-08 NOTE — DISCHARGE INSTRUCTIONS
Patient Education        Myelogram: About This Test  What is it? A myelogram uses X-rays and a special dye to make pictures of bones and nerves of the spine (spinal canal). The spinal canal holds the spinal cord, the spinal nerve roots, and the fluid-filled space between the bones in your spine. Why is this test done? A myelogram is done to check for:  · The cause of arm or leg numbness, weakness, or pain. · Narrowing of the spinal canal (spinal stenosis). · A tumor or infection causing problems with the spinal cord or nerve roots. · A spinal disc that has ruptured (herniated disc). · Inflammation of the membrane that covers the brain and spinal cord. · Problems with the blood vessels to the spine. How can you prepare for the test?  Your doctor will tell you if you need to change how much you eat and drink before the myelogram. You may be asked to increase the amount of water you drink before the test. Follow the instructions your doctor gives you about eating and drinking. Before a myelogram, tell your doctor if:  · You are allergic to any medicines, contrast material, or iodine dye. · You have had bleeding problems, or you take a blood thinner, such as aspirin, clopidogrel (Plavix), or warfarin (Coumadin), or you take over-the-counter medicines, such as ibuprofen (Advil, Motrin) or naproxen (Aleve). Your doctor will tell you when you should stop taking these medicines several days before your procedure. Make sure that you understand exactly what he or she wants you to do. · You have had kidney problems. · You have diabetes, especially if you take metformin (Glucophage, for example). · You are or might be pregnant. Ask someone to take you home and stay with you after the test.  What happens during the test?  The dye is put into your spinal canal with a thin needle. This is called a lumbar puncture. The dye moves through the space so the nerve roots and spinal cord can be seen more clearly.  After the dye is put in, you will lie still while the X-ray pictures are taken. How does it feel? You will feel a quick sting from a small needle that has medicine to numb the skin on your back. You will also feel some pressure as the long, thin spinal needle is put into your spinal canal. You may feel a quick sharp pain down your buttock or leg when the needle is moved in your spine. The dye may make you feel warm and flushed and have a metallic taste in your mouth. What else should you know about the test?  In rare cases, the hole made by the needle in the sac around the spine does not close normally. This can allow spinal fluid to leak out. This leak may need to be repaired through a procedure called an epidural blood patch. To do the patch, your doctor injects some of your own blood to cover the hole. How long does the test take? · A myelogram usually takes 30 minutes to 1 hour. What happens after the test?  · You will probably be able to go home 30 minutes to 2 hours after the test.  · You may need to lie in bed with your head raised for 4 to 24 hours after the test. To prevent seizures, which are a rare side effect, do not bend over or lie down with your head lower than your body. Keeping your head higher than your body after a myelogram also may help prevent or reduce other side effects, such as headache, nausea, or vomiting. · Avoid strenuous activity, such as running or heavy lifting, for at least 1 day after your myelogram.  · Drink plenty of water after the myelogram. Your doctor will give you instructions on taking your regular medicines. When should you call for help? Call 911 anytime you think you may need emergency care. For example, call if:  · You have a seizure. Call your doctor now or seek immediate medical care if:  · You have any increase in pain, weakness, or numbness in your legs. · You have a severe headache or stiff neck, or your eyes become very sensitive to light.   · You have a headache that lasts longer than 24 hours. · You have problems urinating or having a bowel movement. · You have a fever. Follow-up care is a key part of your treatment and safety. Be sure to make and go to all appointments, and call your doctor if you are having problems. It's also a good idea to keep a list of the medicines you take. Ask your doctor when you can expect to have your test results. Where can you learn more? Go to http://janiya-jessika.info/. Enter F015 in the search box to learn more about \"Myelogram: About This Test.\"  Current as of: March 28, 2019  Content Version: 12.1  © 8050-9063 [employer]. Care instructions adapted under license by [employer] (which disclaims liability or warranty for this information). If you have questions about a medical condition or this instruction, always ask your healthcare professional. [place] [address] any warranty or liability for your use of this information. DISCHARGE SUMMARY from Nurse    PATIENT INSTRUCTIONS:    After general anesthesia or intravenous sedation, for 24 hours or while taking prescription Narcotics:  · Limit your activities  · Do not drive and operate hazardous machinery  · Do not make important personal or business decisions  · Do  not drink alcoholic beverages  · If you have not urinated within 8 hours after discharge, please contact your surgeon on call. Report the following to your surgeon:  · Excessive pain, swelling, redness or odor of or around the surgical area  · Temperature over 100.5  · Nausea and vomiting lasting longer than 4 hours or if unable to take medications  · Any signs of decreased circulation or nerve impairment to extremity: change in color, persistent  numbness, tingling, coldness or increase pain  · Any questions    What to do at Home:  Recommended activity: Activity as tolerated,   .     *  Please give a list of your current medications to your Primary Care Provider. *  Please update this list whenever your medications are discontinued, doses are      changed, or new medications (including over-the-counter products) are added. *  Please carry medication information at all times in case of emergency situations. These are general instructions for a healthy lifestyle:    No smoking/ No tobacco products/ Avoid exposure to second hand smoke  Surgeon General's Warning:  Quitting smoking now greatly reduces serious risk to your health. Obesity, smoking, and sedentary lifestyle greatly increases your risk for illness    A healthy diet, regular physical exercise & weight monitoring are important for maintaining a healthy lifestyle    You may be retaining fluid if you have a history of heart failure or if you experience any of the following symptoms:  Weight gain of 3 pounds or more overnight or 5 pounds in a week, increased swelling in our hands or feet or shortness of breath while lying flat in bed. Please call your doctor as soon as you notice any of these symptoms; do not wait until your next office visit. The discharge information has been reviewed with the patient and spouse. The patient and spouse verbalized understanding. Discharge medications reviewed with the patient and spouse and appropriate educational materials and side effects teaching were provided.       Patient armband removed and shredded  ___________________________________________________________________________________________________________________________________

## 2020-06-09 ENCOUNTER — HOSPITAL ENCOUNTER (OUTPATIENT)
Dept: PREADMISSION TESTING | Age: 60
Discharge: HOME | End: 2020-06-09
Payer: COMMERCIAL

## 2020-06-09 ENCOUNTER — HOSPITAL ENCOUNTER (OUTPATIENT)
Dept: LAB | Age: 60
Discharge: HOME | End: 2020-06-09

## 2020-06-09 LAB
ANION GAP SERPL CALC-SCNC: 5 MMOL/L (ref 3–18)
BUN SERPL-MCNC: 17 MG/DL (ref 7–18)
BUN/CREAT SERPL: 29 (ref 12–20)
CALCIUM SERPL-MCNC: 8.7 MG/DL (ref 8.5–10.1)
CHLORIDE SERPL-SCNC: 109 MMOL/L (ref 100–111)
CO2 SERPL-SCNC: 27 MMOL/L (ref 21–32)
CREAT SERPL-MCNC: 0.58 MG/DL (ref 0.6–1.3)
GLUCOSE SERPL-MCNC: 80 MG/DL (ref 74–99)
HCT VFR BLD AUTO: 35.6 % (ref 35–45)
HGB BLD-MCNC: 11.3 G/DL (ref 12–16)
POTASSIUM SERPL-SCNC: 3.9 MMOL/L (ref 3.5–5.5)
SODIUM SERPL-SCNC: 141 MMOL/L (ref 136–145)

## 2020-06-09 PROCEDURE — 85018 HEMOGLOBIN: CPT

## 2020-06-09 PROCEDURE — 93005 ELECTROCARDIOGRAM TRACING: CPT

## 2020-06-09 PROCEDURE — 36415 COLL VENOUS BLD VENIPUNCTURE: CPT

## 2020-06-09 PROCEDURE — 80048 BASIC METABOLIC PNL TOTAL CA: CPT

## 2020-06-10 LAB
ATRIAL RATE: 66 BPM
CALCULATED P AXIS, ECG09: 66 DEGREES
CALCULATED R AXIS, ECG10: 66 DEGREES
CALCULATED T AXIS, ECG11: 35 DEGREES
DIAGNOSIS, 93000: NORMAL
P-R INTERVAL, ECG05: 152 MS
Q-T INTERVAL, ECG07: 436 MS
QRS DURATION, ECG06: 88 MS
QTC CALCULATION (BEZET), ECG08: 457 MS
VENTRICULAR RATE, ECG03: 66 BPM

## 2020-09-21 ENCOUNTER — HOSPITAL ENCOUNTER (OUTPATIENT)
Dept: NON INVASIVE DIAGNOSTICS | Age: 60
Discharge: HOME | End: 2020-09-21
Payer: COMMERCIAL

## 2020-09-21 ENCOUNTER — HOSPITAL ENCOUNTER (OUTPATIENT)
Dept: LAB | Age: 60
Discharge: HOME | End: 2020-09-21
Payer: COMMERCIAL

## 2020-09-21 DIAGNOSIS — Z01.818 PREOPERATIVE EXAMINATION, UNSPECIFIED: ICD-10-CM

## 2020-09-21 LAB
HCT VFR BLD AUTO: 35.9 % (ref 35–45)
HGB BLD-MCNC: 11.4 G/DL (ref 12–16)
POTASSIUM SERPL-SCNC: 3.9 MMOL/L (ref 3.5–5.5)

## 2020-09-21 PROCEDURE — 85018 HEMOGLOBIN: CPT

## 2020-09-21 PROCEDURE — 84132 ASSAY OF SERUM POTASSIUM: CPT

## 2020-09-21 PROCEDURE — 93005 ELECTROCARDIOGRAM TRACING: CPT

## 2020-09-21 PROCEDURE — 36415 COLL VENOUS BLD VENIPUNCTURE: CPT

## 2020-09-22 LAB
ATRIAL RATE: 65 BPM
CALCULATED P AXIS, ECG09: 61 DEGREES
CALCULATED R AXIS, ECG10: 65 DEGREES
CALCULATED T AXIS, ECG11: -9 DEGREES
DIAGNOSIS, 93000: NORMAL
P-R INTERVAL, ECG05: 154 MS
Q-T INTERVAL, ECG07: 424 MS
QRS DURATION, ECG06: 86 MS
QTC CALCULATION (BEZET), ECG08: 440 MS
VENTRICULAR RATE, ECG03: 65 BPM

## 2021-04-22 ENCOUNTER — APPOINTMENT (OUTPATIENT)
Dept: GENERAL RADIOLOGY | Age: 61
DRG: 918 | End: 2021-04-22
Attending: EMERGENCY MEDICINE
Payer: COMMERCIAL

## 2021-04-22 ENCOUNTER — HOSPITAL ENCOUNTER (INPATIENT)
Age: 61
LOS: 2 days | Discharge: HOME | DRG: 918 | End: 2021-04-25
Attending: EMERGENCY MEDICINE | Admitting: FAMILY MEDICINE
Payer: COMMERCIAL

## 2021-04-22 DIAGNOSIS — R77.8 ELEVATED TROPONIN: ICD-10-CM

## 2021-04-22 DIAGNOSIS — R07.9 ACUTE CHEST PAIN: Primary | ICD-10-CM

## 2021-04-22 DIAGNOSIS — R91.8 GROUND GLASS OPACITY PRESENT ON IMAGING OF LUNG: ICD-10-CM

## 2021-04-22 DIAGNOSIS — R94.31 ABNORMAL EKG: ICD-10-CM

## 2021-04-22 LAB
ALBUMIN SERPL-MCNC: 3.4 G/DL (ref 3.4–5)
ALBUMIN/GLOB SERPL: 1.2 {RATIO} (ref 0.8–1.7)
ALP SERPL-CCNC: 102 U/L (ref 45–117)
ALT SERPL-CCNC: 33 U/L (ref 13–56)
ANION GAP SERPL CALC-SCNC: 7 MMOL/L (ref 3–18)
APTT PPP: 31.3 SEC (ref 23–36.4)
AST SERPL-CCNC: 33 U/L (ref 10–38)
BASOPHILS # BLD: 0 K/UL (ref 0–0.1)
BASOPHILS NFR BLD: 0 % (ref 0–2)
BILIRUB SERPL-MCNC: 0.9 MG/DL (ref 0.2–1)
BNP SERPL-MCNC: 1680 PG/ML (ref 0–900)
BUN SERPL-MCNC: 17 MG/DL (ref 7–18)
BUN/CREAT SERPL: 21 (ref 12–20)
CALCIUM SERPL-MCNC: 9.1 MG/DL (ref 8.5–10.1)
CHLORIDE SERPL-SCNC: 105 MMOL/L (ref 100–111)
CK MB CFR SERPL CALC: 0.9 % (ref 0–4)
CK MB SERPL-MCNC: 1.3 NG/ML (ref 5–25)
CK SERPL-CCNC: 142 U/L (ref 26–192)
CO2 SERPL-SCNC: 27 MMOL/L (ref 21–32)
CREAT SERPL-MCNC: 0.81 MG/DL (ref 0.6–1.3)
DIFFERENTIAL METHOD BLD: ABNORMAL
EOSINOPHIL # BLD: 0 K/UL (ref 0–0.4)
EOSINOPHIL NFR BLD: 0 % (ref 0–5)
ERYTHROCYTE [DISTWIDTH] IN BLOOD BY AUTOMATED COUNT: 14.1 % (ref 11.6–14.5)
GLOBULIN SER CALC-MCNC: 2.9 G/DL (ref 2–4)
GLUCOSE SERPL-MCNC: 142 MG/DL (ref 74–99)
HCT VFR BLD AUTO: 36.2 % (ref 35–45)
HGB BLD-MCNC: 11.6 G/DL (ref 12–16)
INR PPP: 1.2 (ref 0.8–1.2)
LYMPHOCYTES # BLD: 1.1 K/UL (ref 0.9–3.6)
LYMPHOCYTES NFR BLD: 13 % (ref 21–52)
MAGNESIUM SERPL-MCNC: 2 MG/DL (ref 1.6–2.6)
MCH RBC QN AUTO: 29.1 PG (ref 24–34)
MCHC RBC AUTO-ENTMCNC: 32 G/DL (ref 31–37)
MCV RBC AUTO: 90.7 FL (ref 74–97)
MONOCYTES # BLD: 0.4 K/UL (ref 0.05–1.2)
MONOCYTES NFR BLD: 4 % (ref 3–10)
NEUTS SEG # BLD: 7.1 K/UL (ref 1.8–8)
NEUTS SEG NFR BLD: 82 % (ref 40–73)
PLATELET # BLD AUTO: 165 K/UL (ref 135–420)
PMV BLD AUTO: 11.1 FL (ref 9.2–11.8)
POTASSIUM SERPL-SCNC: 3.6 MMOL/L (ref 3.5–5.5)
PROT SERPL-MCNC: 6.3 G/DL (ref 6.4–8.2)
PROTHROMBIN TIME: 14.6 SEC (ref 11.5–15.2)
RBC # BLD AUTO: 3.99 M/UL (ref 4.2–5.3)
SODIUM SERPL-SCNC: 139 MMOL/L (ref 136–145)
TROPONIN I SERPL-MCNC: 0.1 NG/ML (ref 0–0.04)
WBC # BLD AUTO: 8.7 K/UL (ref 4.6–13.2)

## 2021-04-22 PROCEDURE — 99285 EMERGENCY DEPT VISIT HI MDM: CPT

## 2021-04-22 PROCEDURE — 87635 SARS-COV-2 COVID-19 AMP PRB: CPT

## 2021-04-22 PROCEDURE — 83880 ASSAY OF NATRIURETIC PEPTIDE: CPT

## 2021-04-22 PROCEDURE — 71045 X-RAY EXAM CHEST 1 VIEW: CPT

## 2021-04-22 PROCEDURE — 94761 N-INVAS EAR/PLS OXIMETRY MLT: CPT

## 2021-04-22 PROCEDURE — 82553 CREATINE MB FRACTION: CPT

## 2021-04-22 PROCEDURE — 85025 COMPLETE CBC W/AUTO DIFF WBC: CPT

## 2021-04-22 PROCEDURE — 93005 ELECTROCARDIOGRAM TRACING: CPT

## 2021-04-22 PROCEDURE — 85730 THROMBOPLASTIN TIME PARTIAL: CPT

## 2021-04-22 PROCEDURE — 80053 COMPREHEN METABOLIC PANEL: CPT

## 2021-04-22 PROCEDURE — 85610 PROTHROMBIN TIME: CPT

## 2021-04-22 PROCEDURE — 83735 ASSAY OF MAGNESIUM: CPT

## 2021-04-23 ENCOUNTER — APPOINTMENT (OUTPATIENT)
Dept: CT IMAGING | Age: 61
DRG: 918 | End: 2021-04-23
Attending: EMERGENCY MEDICINE
Payer: COMMERCIAL

## 2021-04-23 ENCOUNTER — APPOINTMENT (OUTPATIENT)
Dept: NON INVASIVE DIAGNOSTICS | Age: 61
DRG: 918 | End: 2021-04-23
Attending: FAMILY MEDICINE
Payer: COMMERCIAL

## 2021-04-23 PROBLEM — R77.8 ELEVATED TROPONIN: Status: ACTIVE | Noted: 2021-04-23
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
## 2021-04-23 PROBLEM — Z20.822 SUSPECTED COVID-19 VIRUS INFECTION: Status: ACTIVE | Noted: 2021-04-23

## 2021-04-23 PROBLEM — J69.8: Status: ACTIVE | Noted: 2021-04-23

## 2021-04-23 PROBLEM — J69.8: Status: RESOLVED | Noted: 2021-04-23 | Resolved: 2021-04-23

## 2021-04-23 PROBLEM — R91.8 GROUND GLASS OPACITY PRESENT ON IMAGING OF LUNG: Status: RESOLVED | Noted: 2021-04-23 | Resolved: 2021-04-23

## 2021-04-23 PROBLEM — M21.371 ACQUIRED RIGHT FOOT DROP: Status: ACTIVE | Noted: 2021-04-23
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
## 2021-04-23 PROBLEM — J18.9 CAP (COMMUNITY ACQUIRED PNEUMONIA): Status: ACTIVE | Noted: 2021-04-23

## 2021-04-23 PROBLEM — R91.8 GROUND GLASS OPACITY PRESENT ON IMAGING OF LUNG: Status: ACTIVE | Noted: 2021-04-23

## 2021-04-23 PROBLEM — E66.9 OBESITY (BMI 30-39.9): Status: ACTIVE | Noted: 2021-04-23

## 2021-04-23 LAB
ATRIAL RATE: 89 BPM
CALCULATED P AXIS, ECG09: 20 DEGREES
CALCULATED R AXIS, ECG10: 9 DEGREES
CALCULATED T AXIS, ECG11: -126 DEGREES
CHOLEST SERPL-MCNC: 180 MG/DL
CK MB CFR SERPL CALC: 1.5 % (ref 0–4)
CK MB CFR SERPL CALC: 1.9 % (ref 0–4)
CK MB CFR SERPL CALC: ABNORMAL % (ref 0–4)
CK MB SERPL-MCNC: 1.1 NG/ML (ref 5–25)
CK MB SERPL-MCNC: 1.5 NG/ML (ref 5–25)
CK MB SERPL-MCNC: <1 NG/ML (ref 5–25)
CK SERPL-CCNC: 100 U/L (ref 26–192)
CK SERPL-CCNC: 71 U/L (ref 26–192)
CK SERPL-CCNC: 79 U/L (ref 26–192)
COVID-19 RAPID TEST, COVR: NOT DETECTED
D DIMER PPP FEU-MCNC: 2.26 UG/ML(FEU)
DIAGNOSIS, 93000: NORMAL
FERRITIN SERPL-MCNC: 60 NG/ML (ref 8–388)
FIBRINOGEN PPP-MCNC: 534 MG/DL (ref 210–451)
HBA1C MFR BLD: 5.2 % (ref 4.2–5.6)
HDLC SERPL-MCNC: 103 MG/DL (ref 40–60)
HDLC SERPL: 1.7 {RATIO} (ref 0–5)
LDLC SERPL CALC-MCNC: 67.6 MG/DL (ref 0–100)
LIPID PROFILE,FLP: ABNORMAL
P-R INTERVAL, ECG05: 142 MS
Q-T INTERVAL, ECG07: 362 MS
QRS DURATION, ECG06: 84 MS
QTC CALCULATION (BEZET), ECG08: 440 MS
SARS-COV-2, COV2: NORMAL
SOURCE, COVRS: NORMAL
TRIGL SERPL-MCNC: 47 MG/DL (ref ?–150)
TROPONIN I SERPL-MCNC: 0.04 NG/ML (ref 0–0.04)
TROPONIN I SERPL-MCNC: 0.05 NG/ML (ref 0–0.04)
TROPONIN I SERPL-MCNC: 0.06 NG/ML (ref 0–0.04)
VENTRICULAR RATE, ECG03: 89 BPM
VLDLC SERPL CALC-MCNC: 9.4 MG/DL

## 2021-04-23 PROCEDURE — 74011250637 HC RX REV CODE- 250/637: Performed by: FAMILY MEDICINE

## 2021-04-23 PROCEDURE — 83036 HEMOGLOBIN GLYCOSYLATED A1C: CPT

## 2021-04-23 PROCEDURE — 85379 FIBRIN DEGRADATION QUANT: CPT

## 2021-04-23 PROCEDURE — 96376 TX/PRO/DX INJ SAME DRUG ADON: CPT

## 2021-04-23 PROCEDURE — 65660000000 HC RM CCU STEPDOWN

## 2021-04-23 PROCEDURE — 74011250636 HC RX REV CODE- 250/636: Performed by: EMERGENCY MEDICINE

## 2021-04-23 PROCEDURE — 80061 LIPID PANEL: CPT

## 2021-04-23 PROCEDURE — 93306 TTE W/DOPPLER COMPLETE: CPT

## 2021-04-23 PROCEDURE — 71275 CT ANGIOGRAPHY CHEST: CPT

## 2021-04-23 PROCEDURE — U0003 INFECTIOUS AGENT DETECTION BY NUCLEIC ACID (DNA OR RNA); SEVERE ACUTE RESPIRATORY SYNDROME CORONAVIRUS 2 (SARS-COV-2) (CORONAVIRUS DISEASE [COVID-19]), AMPLIFIED PROBE TECHNIQUE, MAKING USE OF HIGH THROUGHPUT TECHNOLOGIES AS DESCRIBED BY CMS-2020-01-R: HCPCS

## 2021-04-23 PROCEDURE — 96375 TX/PRO/DX INJ NEW DRUG ADDON: CPT

## 2021-04-23 PROCEDURE — 74011000250 HC RX REV CODE- 250: Performed by: EMERGENCY MEDICINE

## 2021-04-23 PROCEDURE — 82550 ASSAY OF CK (CPK): CPT

## 2021-04-23 PROCEDURE — 74011250636 HC RX REV CODE- 250/636: Performed by: HOSPITALIST

## 2021-04-23 PROCEDURE — 74011250636 HC RX REV CODE- 250/636: Performed by: FAMILY MEDICINE

## 2021-04-23 PROCEDURE — 82728 ASSAY OF FERRITIN: CPT

## 2021-04-23 PROCEDURE — 85384 FIBRINOGEN ACTIVITY: CPT

## 2021-04-23 PROCEDURE — 74011000636 HC RX REV CODE- 636: Performed by: EMERGENCY MEDICINE

## 2021-04-23 PROCEDURE — 36415 COLL VENOUS BLD VENIPUNCTURE: CPT

## 2021-04-23 PROCEDURE — 74011000250 HC RX REV CODE- 250: Performed by: FAMILY MEDICINE

## 2021-04-23 PROCEDURE — 74011250637 HC RX REV CODE- 250/637: Performed by: HOSPITALIST

## 2021-04-23 PROCEDURE — 97161 PT EVAL LOW COMPLEX 20 MIN: CPT

## 2021-04-23 PROCEDURE — 96374 THER/PROPH/DIAG INJ IV PUSH: CPT

## 2021-04-23 PROCEDURE — 74011250637 HC RX REV CODE- 250/637: Performed by: EMERGENCY MEDICINE

## 2021-04-23 RX ORDER — CEFTRIAXONE 1 G/1
INJECTION, POWDER, FOR SOLUTION INTRAMUSCULAR; INTRAVENOUS
Status: DISCONTINUED
Start: 2021-04-23 | End: 2021-04-23 | Stop reason: WASHOUT

## 2021-04-23 RX ORDER — ACETAMINOPHEN 325 MG/1
650 TABLET ORAL
Status: DISCONTINUED | OUTPATIENT
Start: 2021-04-23 | End: 2021-04-25 | Stop reason: HOSPADM

## 2021-04-23 RX ORDER — MORPHINE SULFATE 4 MG/ML
4 INJECTION INTRAVENOUS
Status: COMPLETED | OUTPATIENT
Start: 2021-04-23 | End: 2021-04-23

## 2021-04-23 RX ORDER — CHOLECALCIFEROL TAB 125 MCG (5000 UNIT) 125 MCG
5000 TAB ORAL 2 TIMES DAILY
Status: DISCONTINUED | OUTPATIENT
Start: 2021-04-23 | End: 2021-04-25 | Stop reason: HOSPADM

## 2021-04-23 RX ORDER — ACETAMINOPHEN 650 MG/1
650 SUPPOSITORY RECTAL
Status: DISCONTINUED | OUTPATIENT
Start: 2021-04-23 | End: 2021-04-25 | Stop reason: HOSPADM

## 2021-04-23 RX ORDER — SODIUM CHLORIDE, SODIUM LACTATE, POTASSIUM CHLORIDE, CALCIUM CHLORIDE 600; 310; 30; 20 MG/100ML; MG/100ML; MG/100ML; MG/100ML
100 INJECTION, SOLUTION INTRAVENOUS CONTINUOUS
Status: DISCONTINUED | OUTPATIENT
Start: 2021-04-23 | End: 2021-04-25 | Stop reason: HOSPADM

## 2021-04-23 RX ORDER — ROPINIROLE 1 MG/1
4 TABLET, FILM COATED ORAL 3 TIMES DAILY
Status: DISCONTINUED | OUTPATIENT
Start: 2021-04-23 | End: 2021-04-23

## 2021-04-23 RX ORDER — LEVOFLOXACIN 5 MG/ML
500 INJECTION, SOLUTION INTRAVENOUS EVERY 24 HOURS
Status: DISCONTINUED | OUTPATIENT
Start: 2021-04-23 | End: 2021-04-24

## 2021-04-23 RX ORDER — LANOLIN ALCOHOL/MO/W.PET/CERES
5000 CREAM (GRAM) TOPICAL DAILY
Status: DISCONTINUED | OUTPATIENT
Start: 2021-04-23 | End: 2021-04-25 | Stop reason: HOSPADM

## 2021-04-23 RX ORDER — GUAIFENESIN 100 MG/5ML
81 LIQUID (ML) ORAL DAILY
Status: DISCONTINUED | OUTPATIENT
Start: 2021-04-23 | End: 2021-04-25 | Stop reason: HOSPADM

## 2021-04-23 RX ORDER — GUAIFENESIN 100 MG/5ML
324 LIQUID (ML) ORAL
Status: COMPLETED | OUTPATIENT
Start: 2021-04-23 | End: 2021-04-23

## 2021-04-23 RX ORDER — DULOXETIN HYDROCHLORIDE 30 MG/1
30 CAPSULE, DELAYED RELEASE ORAL
Status: DISCONTINUED | OUTPATIENT
Start: 2021-04-23 | End: 2021-04-25 | Stop reason: HOSPADM

## 2021-04-23 RX ORDER — ONDANSETRON 2 MG/ML
4 INJECTION INTRAMUSCULAR; INTRAVENOUS
Status: DISCONTINUED | OUTPATIENT
Start: 2021-04-23 | End: 2021-04-25 | Stop reason: HOSPADM

## 2021-04-23 RX ORDER — ENOXAPARIN SODIUM 100 MG/ML
40 INJECTION SUBCUTANEOUS DAILY
Status: DISCONTINUED | OUTPATIENT
Start: 2021-04-23 | End: 2021-04-25 | Stop reason: HOSPADM

## 2021-04-23 RX ORDER — ROPINIROLE 1 MG/1
12 TABLET, FILM COATED ORAL
Status: DISCONTINUED | OUTPATIENT
Start: 2021-04-23 | End: 2021-04-25 | Stop reason: HOSPADM

## 2021-04-23 RX ORDER — SODIUM CHLORIDE 0.9 % (FLUSH) 0.9 %
5-40 SYRINGE (ML) INJECTION EVERY 8 HOURS
Status: DISCONTINUED | OUTPATIENT
Start: 2021-04-23 | End: 2021-04-25 | Stop reason: HOSPADM

## 2021-04-23 RX ORDER — GUAIFENESIN 100 MG/5ML
81 LIQUID (ML) ORAL
Status: DISCONTINUED | OUTPATIENT
Start: 2021-04-23 | End: 2021-04-23

## 2021-04-23 RX ORDER — ONDANSETRON 2 MG/ML
4 INJECTION INTRAMUSCULAR; INTRAVENOUS
Status: COMPLETED | OUTPATIENT
Start: 2021-04-23 | End: 2021-04-23

## 2021-04-23 RX ORDER — CEFTRIAXONE 1 G/1
2 INJECTION, POWDER, FOR SOLUTION INTRAMUSCULAR; INTRAVENOUS ONCE
Status: DISCONTINUED | OUTPATIENT
Start: 2021-04-23 | End: 2021-04-23 | Stop reason: CLARIF

## 2021-04-23 RX ORDER — DULOXETIN HYDROCHLORIDE 60 MG/1
30 CAPSULE, DELAYED RELEASE ORAL
COMMUNITY

## 2021-04-23 RX ORDER — SODIUM CHLORIDE 0.9 % (FLUSH) 0.9 %
5-40 SYRINGE (ML) INJECTION AS NEEDED
Status: DISCONTINUED | OUTPATIENT
Start: 2021-04-23 | End: 2021-04-25 | Stop reason: HOSPADM

## 2021-04-23 RX ORDER — PROMETHAZINE HYDROCHLORIDE 25 MG/1
12.5 TABLET ORAL
Status: DISCONTINUED | OUTPATIENT
Start: 2021-04-23 | End: 2021-04-25 | Stop reason: HOSPADM

## 2021-04-23 RX ADMIN — ACETAMINOPHEN 650 MG: 325 TABLET ORAL at 21:04

## 2021-04-23 RX ADMIN — IOPAMIDOL 80 ML: 755 INJECTION, SOLUTION INTRAVENOUS at 02:17

## 2021-04-23 RX ADMIN — ASPIRIN 324 MG: 81 TABLET, CHEWABLE ORAL at 01:04

## 2021-04-23 RX ADMIN — ASPIRIN 81 MG: 81 TABLET, CHEWABLE ORAL at 06:13

## 2021-04-23 RX ADMIN — SODIUM CHLORIDE, SODIUM LACTATE, POTASSIUM CHLORIDE, AND CALCIUM CHLORIDE 100 ML/HR: 600; 310; 30; 20 INJECTION, SOLUTION INTRAVENOUS at 06:56

## 2021-04-23 RX ADMIN — CEFTRIAXONE 2 G: 2 INJECTION, POWDER, FOR SOLUTION INTRAMUSCULAR; INTRAVENOUS at 01:31

## 2021-04-23 RX ADMIN — ONDANSETRON 4 MG: 2 INJECTION INTRAMUSCULAR; INTRAVENOUS at 01:45

## 2021-04-23 RX ADMIN — AZITHROMYCIN MONOHYDRATE 500 MG: 500 INJECTION, POWDER, LYOPHILIZED, FOR SOLUTION INTRAVENOUS at 02:09

## 2021-04-23 RX ADMIN — MORPHINE SULFATE 4 MG: 4 INJECTION INTRAVENOUS at 00:59

## 2021-04-23 RX ADMIN — FAMOTIDINE 20 MG: 10 INJECTION INTRAVENOUS at 08:30

## 2021-04-23 RX ADMIN — CHOLECALCIFEROL TAB 125 MCG (5000 UNIT) 5000 UNITS: 125 TAB at 20:52

## 2021-04-23 RX ADMIN — Medication 10 ML: at 20:54

## 2021-04-23 RX ADMIN — DULOXETINE HYDROCHLORIDE 30 MG: 30 CAPSULE, DELAYED RELEASE ORAL at 23:06

## 2021-04-23 RX ADMIN — Medication 10 ML: at 05:24

## 2021-04-23 RX ADMIN — ONDANSETRON 4 MG: 2 INJECTION INTRAMUSCULAR; INTRAVENOUS at 00:56

## 2021-04-23 RX ADMIN — LEVOFLOXACIN 500 MG: 5 INJECTION, SOLUTION INTRAVENOUS at 08:29

## 2021-04-23 RX ADMIN — FAMOTIDINE 20 MG: 10 INJECTION INTRAVENOUS at 20:52

## 2021-04-23 RX ADMIN — ROPINIROLE HYDROCHLORIDE 12 MG: 1 TABLET, FILM COATED ORAL at 20:53

## 2021-04-23 NOTE — ED PROVIDER NOTES
EMERGENCY DEPARTMENT HISTORY AND PHYSICAL EXAM    Date: 4/22/2021  Patient Name: Esme Nicholas    History of Presenting Illness     Chief Complaint   Patient presents with    Cough    Shortness of Breath         History Provided By: Patient    Additional History (Context):   10:35 PM   Esme Nicholas is a 61 y.o. female with PMHX of narcolepsy, chronic low back pain, paroxysmal A. fib, dyspnea who presents to the emergency department C/O chest pains and concern for Covid. Patient states she been having chest pain which started insidiously this morning and progressively worsened throughout the day. Is worse with some activity and physical motion and cough however she did not have history of pain when walking. She has no specific knowledge of known exposures to coronavirus. She been having some suggestive fevers and chills. She has no abdominal pain. Social History  She quit smoking about 15 years ago. She occasionally still has drink of alcohol but does not drink excessively. She denies any illicit substance use    Family History  Denies premature heart disease      PCP: Bryon Diaz MD    Current Outpatient Medications   Medication Sig Dispense Refill    cholecalciferol (VITAMIN D3) 2,000 unit cap capsule Take 5,000 Units by mouth two (2) times a day.  cyanocobalamin (VITAMIN B-12) 1,000 mcg tablet Take 5,000 mcg by mouth daily.  dextroamphetamine-amphetamine (ADDERALL) 30 mg tablet Take 30 mg by mouth.  omeprazole (PRILOSEC) 20 mg capsule Take 20 mg by mouth daily.  ROPINIROLE HCL (REQUIP PO) Take 12 mg by mouth daily.  Indications: restless leg syndrome         Past History     Past Medical History:  Past Medical History:   Diagnosis Date    Arrhythmia     hx of afib-due to stress and history of tachycardia    Arthritis     osteo-both hands, back    CAD (coronary artery disease)     Pt reports that in the past EKG shows that she had a heart attack-but all testing was neg    Chronic pain     lower back    MRSA (methicillin resistant Staphylococcus aureus) colonization 2016    Narcolepsy     Nausea & vomiting     Restless legs syndrome     Shortness of breath        Past Surgical History:  Past Surgical History:   Procedure Laterality Date    HX  SECTION      x 2    HX GASTRIC BYPASS      gastric sleeve    HX GYN      ectopic preg    HX ORTHOPAEDIC      HX OTHER SURGICAL      gastric sleeve    HX SEPTOPLASTY      HX TUBAL LIGATION      NEUROLOGICAL PROCEDURE UNLISTED      back surgery x 2    NEUROLOGICAL PROCEDURE UNLISTED      neck surgery    NEUROLOGICAL PROCEDURE UNLISTED      spinal cord stimulator       Family History:  Family History   Family history unknown: Yes       Social History:  Social History     Tobacco Use    Smoking status: Former Smoker     Quit date: 2004     Years since quittin.2    Smokeless tobacco: Never Used   Substance Use Topics    Alcohol use: Yes     Alcohol/week: 7.0 standard drinks     Types: 7 Shots of liquor per week     Comment: daily-\"white Norwegian\"    Drug use: No       Allergies: Allergies   Allergen Reactions    Keflex [Cephalexin] Other (comments)     Caused vasculitis         Review of Systems   Review of Systems   Constitutional: Positive for appetite change, chills and fatigue. Respiratory: Positive for cough, shortness of breath and wheezing. Cardiovascular: Positive for chest pain. Negative for palpitations. Gastrointestinal: Positive for nausea. All other systems reviewed and are negative. Physical Exam     Vitals:    21 0215 21 0230 21 0245 21 0300   BP:       Pulse: 70 62 61 70   Resp: 21 15 15 15   Temp:       SpO2: 99% 98% 96% 96%   Weight:       Height:         Physical Exam  Vitals signs and nursing note reviewed. Constitutional:       General: She is not in acute distress. Appearance: She is well-developed. She is not diaphoretic.       Comments: Tired and fatigued appearing but nontoxic   HENT:      Head: Normocephalic and atraumatic. Eyes:      General: No scleral icterus. Extraocular Movements:      Right eye: Normal extraocular motion. Left eye: Normal extraocular motion. Conjunctiva/sclera: Conjunctivae normal.      Pupils: Pupils are equal, round, and reactive to light. Neck:      Musculoskeletal: Normal range of motion and neck supple. Trachea: No tracheal deviation. Cardiovascular:      Rate and Rhythm: Normal rate and regular rhythm. Heart sounds: Normal heart sounds. Pulmonary:      Effort: Pulmonary effort is normal. No respiratory distress. Breath sounds: Normal breath sounds. No stridor. Abdominal:      General: Bowel sounds are normal. There is no distension. Palpations: Abdomen is soft. Tenderness: There is no abdominal tenderness. There is no rebound. Musculoskeletal: Normal range of motion. General: No tenderness. Comments: Grossly unremarkable without abnormalities   Skin:     General: Skin is warm and dry. Capillary Refill: Capillary refill takes less than 2 seconds. Findings: No erythema or rash. Neurological:      Mental Status: She is alert and oriented to person, place, and time. GCS: GCS eye subscore is 4. GCS verbal subscore is 5. GCS motor subscore is 6. Cranial Nerves: Cranial nerves are intact. No cranial nerve deficit. Motor: Motor function is intact. No weakness, abnormal muscle tone or seizure activity. Coordination: Coordination is intact. Psychiatric:         Mood and Affect: Mood normal.         Behavior: Behavior normal.         Thought Content:  Thought content normal.         Judgment: Judgment normal.       Diagnostic Study Results     Labs -     Recent Results (from the past 12 hour(s))   EKG, 12 LEAD, INITIAL    Collection Time: 04/22/21 10:35 PM   Result Value Ref Range    Ventricular Rate 89 BPM    Atrial Rate 89 BPM    P-R Interval 142 ms    QRS Duration 84 ms    Q-T Interval 362 ms    QTC Calculation (Bezet) 440 ms    Calculated P Axis 20 degrees    Calculated R Axis 9 degrees    Calculated T Axis -126 degrees    Diagnosis       Normal sinus rhythm  T wave abnormality, consider inferolateral ischemia  Abnormal ECG  Confirmed by Shiraz Lamb MD, [facility] ([id number]) on 4/23/2021 12:10:17 AM     CBC WITH AUTOMATED DIFF    Collection Time: 04/22/21 10:40 PM   Result Value Ref Range    WBC 8.7 4.6 - 13.2 K/uL    RBC 3.99 (L) 4.20 - 5.30 M/uL    HGB 11.6 (L) 12.0 - 16.0 g/dL    HCT 36.2 35.0 - 45.0 %    MCV 90.7 74.0 - 97.0 FL    MCH 29.1 24.0 - 34.0 PG    MCHC 32.0 31.0 - 37.0 g/dL    RDW 14.1 11.6 - 14.5 %    PLATELET 780 325 - 372 K/uL    MPV 11.1 9.2 - 11.8 FL    NEUTROPHILS 82 (H) 40 - 73 %    LYMPHOCYTES 13 (L) 21 - 52 %    MONOCYTES 4 3 - 10 %    EOSINOPHILS 0 0 - 5 %    BASOPHILS 0 0 - 2 %    ABS. NEUTROPHILS 7.1 1.8 - 8.0 K/UL    ABS. LYMPHOCYTES 1.1 0.9 - 3.6 K/UL    ABS. MONOCYTES 0.4 0.05 - 1.2 K/UL    ABS. EOSINOPHILS 0.0 0.0 - 0.4 K/UL    ABS. BASOPHILS 0.0 0.0 - 0.1 K/UL    DF AUTOMATED     METABOLIC PANEL, COMPREHENSIVE    Collection Time: 04/22/21 10:40 PM   Result Value Ref Range    Sodium 139 136 - 145 mmol/L    Potassium 3.6 3.5 - 5.5 mmol/L    Chloride 105 100 - 111 mmol/L    CO2 27 21 - 32 mmol/L    Anion gap 7 3.0 - 18 mmol/L    Glucose 142 (H) 74 - 99 mg/dL    BUN 17 7.0 - 18 MG/DL    Creatinine 0.81 0.6 - 1.3 MG/DL    BUN/Creatinine ratio 21 (H) 12 - 20      GFR est AA >60 >60 ml/min/1.73m2    GFR est non-AA >60 >60 ml/min/1.73m2    Calcium 9.1 8.5 - 10.1 MG/DL    Bilirubin, total 0.9 0.2 - 1.0 MG/DL    ALT (SGPT) 33 13 - 56 U/L    AST (SGOT) 33 10 - 38 U/L    Alk.  phosphatase 102 45 - 117 U/L    Protein, total 6.3 (L) 6.4 - 8.2 g/dL    Albumin 3.4 3.4 - 5.0 g/dL    Globulin 2.9 2.0 - 4.0 g/dL    A-G Ratio 1.2 0.8 - 1.7     MAGNESIUM    Collection Time: 04/22/21 10:40 PM   Result Value Ref Range    Magnesium 2.0 1.6 - 2.6 mg/dL CARDIAC PANEL,(CK, CKMB & TROPONIN)    Collection Time: 04/22/21 10:40 PM   Result Value Ref Range    CK - MB 1.3 <3.6 ng/ml    CK-MB Index 0.9 0.0 - 4.0 %     26 - 192 U/L    Troponin-I, QT 0.10 (H) 0.0 - 0.045 NG/ML   NT-PRO BNP    Collection Time: 04/22/21 10:40 PM   Result Value Ref Range    NT pro-BNP 1,680 (H) 0 - 900 PG/ML   PROTHROMBIN TIME + INR    Collection Time: 04/22/21 10:40 PM   Result Value Ref Range    Prothrombin time 14.6 11.5 - 15.2 sec    INR 1.2 0.8 - 1.2     PTT    Collection Time: 04/22/21 10:40 PM   Result Value Ref Range    aPTT 31.3 23.0 - 36.4 SEC   COVID-19 RAPID TEST    Collection Time: 04/22/21 11:35 PM   Result Value Ref Range    Specimen source Nasopharyngeal      COVID-19 rapid test Not detected NOTD         Radiologic Studies -   CTA CHEST W OR W WO CONT   Final Result      No evidence of pulmonary embolism. Lower lung consolidations and groundglass   opacification consistent with multifocal pneumonia. XR CHEST PORT   Final Result      Right greater than left basilar pulmonary opacities, suspicious for pneumonia in   the setting of cough. CT Results  (Last 48 hours)               04/23/21 0214  CTA CHEST W OR W WO CONT Final result    Impression:      No evidence of pulmonary embolism. Lower lung consolidations and groundglass   opacification consistent with multifocal pneumonia. Narrative:  EXAM: CTA CHEST W OR W WO CONT       CLINICAL INDICATION/HISTORY:  Shortness of breath    -Additional: None       COMPARISON: Same day radiograph       TECHNIQUE: Helical CT imaging from the thoracic inlet through the diaphragm with   intravenous contrast. Coronal and sagittal MIP reformats were generated. One or   more dose reduction techniques were used on this CT: automated exposure control,   adjustment of the mAs and/or kVp according to patient size, and iterative   reconstruction techniques.  The specific techniques used on this CT exam have   been documented in the patient's electronic medical record. Digital Imaging and   Communications in Medicine (DICOM) format image data are available to   nonaffiliated external healthcare facilities or entities on a secure, media   free, reciprocally searchable basis with patient authorization for at least a   12-month period after this study. _______________       FINDINGS:       EXAM QUALITY: Adequate. PULMONARY ARTERIES: No pulmonary artery filling defects. Normal diameter of the   main PA.        MEDIASTINUM: Normal heart size. Aorta is unremarkable. No pericardial effusion. LUNGS: Regions of consolidation and groundglass opacification symmetrically   affecting the mid and lower lungs. PLEURA: Normal.       AIRWAY: Normal.       LYMPH NODES:  No enlarged nodes. UPPER ABDOMEN: Evidence of prior gastric sleeve procedure. Otherwise   unremarkable. OSSEOUS: No acute or aggressive osseous abnormalities identified. OTHER: None.       _______________               CXR Results  (Last 48 hours)               04/22/21 2312  XR CHEST PORT Final result    Impression:      Right greater than left basilar pulmonary opacities, suspicious for pneumonia in   the setting of cough. Narrative:  EXAM: FRONTAL CHEST RADIOGRAPH       CLINICAL INDICATION/HISTORY: Cough       COMPARISON: 10/10/2010       TECHNIQUE: Frontal view of the chest       _______________       FINDINGS:       HEART AND MEDIASTINUM: Unremarkable. LUNGS AND PLEURAL SPACES: Heterogeneous opacities in both lung bases, more dense   on the right. No visible pleural abnormalities.        BONY THORAX AND SOFT TISSUES: Unremarkable.       _______________                 Medications given in the ED-  Medications   azithromycin (ZITHROMAX) 500 mg in 0.9% sodium chloride 250 mL (VIAL-MATE) (500 mg IntraVENous Given 4/23/21 0209)   aspirin chewable tablet 324 mg (324 mg Oral Given 4/23/21 0104)   morphine injection 4 mg (4 mg IntraVENous Given 4/23/21 0059)   ondansetron (ZOFRAN) injection 4 mg (4 mg IntraVENous Given 4/23/21 0056)   cefTRIAXone (ROCEPHIN) 2 g in sterile water (preservative free) 20 mL IV syringe (2 g IntraVENous Given 4/23/21 0131)   ondansetron (ZOFRAN) injection 4 mg (4 mg IntraVENous Given 4/23/21 0145)   iopamidoL (ISOVUE-370) 76 % injection  mL (80 mL IntraVENous Given 4/23/21 0217)         Medical Decision Making   I am the first provider for this patient. I reviewed the vital signs, available nursing notes, past medical history, past surgical history, family history and social history. Vital Signs-Reviewed the patient's vital signs. Pulse Oximetry Analysis -96% on room air    Cardiac Monitor:  Rate: 73 bpm  Rhythm: Sinus rhythm    EKG interpretation: (Preliminary)  10:37 PM    Normal sinus rhythm, nonspecific changes including T wave inversions leads I, 2, aVL, V5, V6  EKG read by Doron Leigh MD   Old EKGs in 2020 show some depressions in inferior leads 23V and aVF but not so much the lateral leads I and aVL. Records Reviewed: NURSING NOTES AND PREVIOUS MEDICAL RECORDS    Provider Notes (Medical Decision Making):   Patient has pleuritic type chest pain along with concerns for Covid. Her EKG shows some significant changes which are little bit different from her previous. She is been told she has had some problems before has had work-ups but this was about 6 or 7 years ago. Her CAT scan today shows some groundglass opacities strongly concern for atypical pneumonia however review of her records showed she has had atypical findings on a CTA 2 years ago in 2019. She was started on community acquired pneumonia antibiotics as well as steroids as her troponin was elevated after discussion she is agreement to stay in the hospital.  This case was further discussed with Dr. Dahra Jo who will be available to see the patient in consultation.     Procedures:  Procedures    ED Course:   10:35 PM: Initial assessment performed. The patients presenting problems have been discussed, and they are in agreement with the care plan formulated and outlined with them. I have encouraged them to ask questions as they arise throughout their visit. CONSULT NOTE:   Jada Rizo MD   spoke with Dr. Maria Luisa Arriola  Specialty: Cardiology  Discussed pt's hx, disposition, and available diagnostic and imaging results  over the telephone. Reviewed care plans. Consulting physician agrees with plans as outlined. We will see the patient in consultation. At this point he has not greatly concerned about starting heparin or other anticoagulation unless CTA should show evidence of pulmonary embolism more EKG monitor demonstrate more evidence of ischemia. We will continue to follow enzymes and follow patient condition. .      CONSULT NOTE:   4:19 AM  Jada Rizo MD   spoke with Dr. Lenin Alexis  Specialty: Hospitalist  Discussed pt's hx, disposition, and available diagnostic and imaging results  over the telephone. Reviewed care plans. Consulting physician agrees with plans as outlined. Will admit patient to cardiac telemetry continue antibiotics and follow her condition. Diagnosis and Disposition     CRITICAL CARE NOTE:  4:30 AM  I have spent 55 minutes of critical care time involved in lab review, consultations with specialist, family decision-making, and documentation. During this entire length of time I was immediately available to the patient. Critical Care: The reason for providing this level of medical care for this critically ill patient was due a critical illness that impaired one or more vital organ systems such that there was a high probability of imminent or life threatening deterioration in the patients condition.  This care involved high complexity decision making to assess, manipulate, and support vital system functions, to treat this degreee vital organ system failure and to prevent further life threatening deterioration of the patients condition. Core Measures:  For Hospitalized Patients:    1. Hospitalization Decision Time:  The decision to hospitalize the patient was made by Rafi Alvarenga MD   at 11:55 PM on 4/22/2021    2. Aspirin: Aspirin was given    3:55 AM   Patient is being admitted to the hospital by Dr. Alexandria Lamb. The results of their tests and reasons for their admission have been discussed with them and/or available family. They convey agreement and understanding for the need to be admitted and for their admission diagnosis. CONDITIONS ON ADMISSION:  Sepsis is not present at the time of admission. Deep Vein Thrombosis is not present at the time of admission. Thrombosis is not present at the time of admission. Urinary Tract Infection is not present at the time of admission. Pneumonia is not present at the time of admission. MRSA is not present at the time of admission. Wound infection is not present at the time of admission. Pressure Ulcer is not present at the time of admission. CLINICAL IMPRESSION:    1. Acute chest pain    2. Ground glass opacity present on imaging of lung    3. Elevated troponin    4. Abnormal EKG        _______________________________    This note was partially transcribed via voice recognition software. Although efforts have been made to catch any discrepancies, it may contain sound alike words, grammatical errors, or nonsensical words.

## 2021-04-23 NOTE — PROGRESS NOTES
2146 Admission assessment completed. Patient is alert and oriented x4. Lungs are coarse on room air. Patient has removed nasal cannula. Tele box 19 applied, running normal sinus rhythm. Last bowel movement on 04/22. Patient has a 20 gauge IV to the left AC, INT. Skin is pale, but intact. Bruising to right upper arm and right leg from a fall. Patient also has right foot drop. Varicosities present. Trace edema to bilateral lower legs. Patient has no pain when not moving. Lovenox ordered for DVT prophylaxis. Call light within reach. 5440 Patient swabbed for COVID.    0700 Bedside and verbal shift change report given to Isidro Rollins RN (oncoming nurse) by Sophia Brown RN (offgoing nurse). Report included the following information: SBAR, Kardex, MAR, and recent results.

## 2021-04-23 NOTE — ROUTINE PROCESS
Bedside and Verbal shift change report given to [address] [place] (oncoming nurse) by Beny Mosqueda RN (offgoing nurse). Report included the following information SBAR, Kardex, MAR and Recent Results.

## 2021-04-23 NOTE — PROGRESS NOTES
1920 - Assumed care at this time. 2059 - Patient A&Ox4, RA. Denies chest pain and SOB. Lung sounds diminished. Pt c/o of headache pain 4/10, pain medication administered per MAR. Pt isolation precautions. Pt verbalized understanding, no concerns voiced. Call bell within reach, bed in lowest position. Pt encouraged to call for assistance.

## 2021-04-23 NOTE — H&P
History & Physical    Patient: Cindy Delgadillo MRN: [id number]  CSN: [id number]    YOB: 1960  Age: 61 y.o. Sex: female      DOA: 4/22/2021  Primary Care Provider:  Bobbi Vasquez MD      Assessment/Plan     Patient Active Problem List   Diagnosis Code    Rectal perforation ([facility] [address] K63.1    Perforated rectum ([facility] [address] K63.1    Paroxysmal atrial fibrillation (HCC) I48.0    Chronic pain syndrome G89.4    Chronic back pain M54.9, G89.29    CAP (community acquired pneumonia) J18.9    Ground glass opacity present on imaging of lung R91.8    Obesity (BMI 30-39. 9) E66.9    Fall at home [address] [place], Y92.009    Acquired right foot drop M21.371    Elevated troponin R77.8    Pneumonitis due to liquids [facility]) J65.11     10year-old female with chronic pain syndrome, paroxysmal atrial fibrillation, and obesity is admitted with elevated troponin, community-acquired pneumonia, groundglass opacities of the lungs, and likely pneumonitis due to inhaled ammonia exposure. Elevated troponin-suspect due to demand ischemia from pneumonia  -N.p.o. until cardiology evaluation today  -Telemetry  -Trend troponins  -Echocardiogram  -Cardiology consult to consider stress testing  -Start aspirin 81 mg daily    Community-acquired pneumonia with groundglass opacities on imaging with likely pneumonitis due to chemical exposure  -Rocephin for antibiotic treatment  -Patient declines pulmonary consult as she has had outpatient work-up  -Maintain isolation precautions until Covid PCR returns    Paroxysmal atrial fibrillation  -Telemetry  -Metoprolol p.o. as needed    Fall at home due to acquired right foot drop-ambulates with a walker  -PT evaluation  -Fall precautions  --Check UA to ensure no UTI    Chronic pain syndrome-due to multiple back procedures in the past, spinal cord stimulator in place    Obesity-BMI 31  -Aggressive lifestyle modification needed  -Follow-up hemoglobin A1c and lipid panel    Partial code-is DNI.   She does not want to be on a ventilator but is okay with other resuscitation efforts. Prophylaxis: Pepcid IV, Lovenox SQ    Estimated length of stay : 2 nights    Corinne Gilbert, MD  Nocturnist  ----------------------------------------------------------------------------------------------------------------------------------------------------------------------------------------------------------------  CC: Shortness of breath       HPI:     Bishop Espinoza is a 61 y.o. female with chronic pain syndrome, paroxysmal atrial fibrillation, and obesity presents to the ED with worsening cough and shortness of breath since yesterday. She reports a years long history of dyspnea on exertion especially when climbing the stairs. She has not had recent cardiac stress testing. She denies any fever but has had cough and diarrhea. She has not received her Covid vaccines. She reports she had a severe pneumonia in 1975 that required hospital admission. She uses an inhaler as needed for shortness of breath with exertion but reports that her pulmonary work-up has been negative. She states that she has had multiple lung studies done but do not show evidence of asthma or COPD. 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 She does not usually wear a mask when using ammonia, and has been doing this for the past 1.5 years. She also reports that she has a chronic right foot drop and she caught her foot on a rug yesterday and fell. She does not usually use a walker around the house but sometimes uses a cane. She denies any bowel or bladder incontinence.     Past Medical History:   Diagnosis Date    Arrhythmia     hx of afib-due to stress and history of tachycardia    Arthritis     osteo-both hands, back    CAD (coronary artery disease)     Pt reports that in the past EKG shows that she had a heart attack-but all testing was neg    Chronic pain     lower back    MRSA (methicillin resistant Staphylococcus aureus) colonization 2016    Narcolepsy     Nausea & vomiting     Restless legs syndrome     Shortness of breath        Past Surgical History:   Procedure Laterality Date    HX  SECTION      x 2    HX GASTRIC BYPASS      gastric sleeve    HX GYN      ectopic preg    HX ORTHOPAEDIC      HX OTHER SURGICAL      gastric sleeve    HX SEPTOPLASTY      HX TUBAL LIGATION      NEUROLOGICAL PROCEDURE UNLISTED      back surgery x 2    NEUROLOGICAL PROCEDURE UNLISTED      neck surgery    NEUROLOGICAL PROCEDURE UNLISTED      spinal cord stimulator       Family History   Family history unknown: Yes       Social History     Socioeconomic History    Marital status:      Spouse name: Not on file    Number of children: Not on file    Years of education: Not on file    Highest education level: Not on file   Tobacco Use    Smoking status: Former Smoker     Quit date: 2004     Years since quittin.2    Smokeless tobacco: Never Used   Substance and Sexual Activity    Alcohol use: Yes     Alcohol/week: 7.0 standard drinks     Types: 7 Shots of liquor per week     Comment: daily-\"white Cymraes\"    Drug use: No    Sexual activity: Never   Other Topics Concern       Prior to Admission medications    Medication Sig Start Date End Date Taking? Authorizing Provider   cholecalciferol (VITAMIN D3) 2,000 unit cap capsule Take 5,000 Units by mouth two (2) times a day. Provider, Historical   cyanocobalamin (VITAMIN B-12) 1,000 mcg tablet Take 5,000 mcg by mouth daily. Provider, Historical   dextroamphetamine-amphetamine (ADDERALL) 30 mg tablet Take 30 mg by mouth. Provider, Historical   omeprazole (PRILOSEC) 20 mg capsule Take 20 mg by mouth daily. Provider, Historical   ROPINIROLE HCL (REQUIP PO) Take 12 mg by mouth daily.  Indications: restless leg syndrome    Other, MD Megan       Allergies   Allergen Reactions    Keflex [Cephalexin] Other (comments)     Caused vasculitis       Review of Systems  Gen: No fever, chills, malaise. Heent: No headache, rhinorrhea, sore throat. Heart: No chest pain, palpitations, +ABRAHAM for years, no pnd or orthopnea. Resp: +cough, shortness of breath. GI: No nausea, vomiting, diarrhea, constipation, melena or hematochezia. : No urinary obstruction, dysuria or hematuria. Derm: No rash, new skin lesion or pruritis. Musc/skeletal: chronic right foot drop, recent fall  Vasc: No edema, cyanosis or claudication. Endo: No heat/cold intolerance, no polyuria,polydipsia or polyphagia. Neuro: No unilateral weakness, numbness, tingling. No seizures. Heme: +easy bruising or bleeding. Physical Exam:     Physical Exam:  Visit Vitals  BP (!) 101/55 (BP 1 Location: Left upper arm, BP Patient Position: At rest)   Pulse 60   Temp 97.8 °F (36.6 °C)   Resp 14   Ht 5' 4\" (1.626 m)   Wt 81.6 kg (180 lb)   SpO2 97%   BMI 30.90 kg/m²    O2 Flow Rate (L/min): 2 l/min O2 Device: None (Room air)    Temp (24hrs), Av.2 °F (36.2 °C), Min:96.2 °F (35.7 °C), Max:97.8 °F (36.6 °C)    No intake/output data recorded. No intake/output data recorded. General:  Awake, cooperative, no distress. Head:  Normocephalic, without obvious abnormality, atraumatic. Eyes:  Conjunctivae/corneas clear, sclera anicteric. Neck: Supple, symmetrical, trachea midline, no adenopathy. Lungs:   Clear to auscultation bilaterally, no wheezing/rales. Heart:  Regular rate and rhythm, S1, S2 normal, no murmur, click, rub or gallop. Abdomen: Soft, non-tender. Bowel sounds normal. No masses,  No organomegaly. Extremities: Extremities normal, atraumatic, no cyanosis or edema. Capillary refill normal.   Pulses: 2+ and symmetric all extremities. Skin: Skin color pink, turgor normal. No rashes or lesions   Neurologic: Chronic R foot drop, not able to dorsiflex toes. Labs Reviewed: All lab results for the last 24 hours reviewed.   Recent Results (from the past 24 hour(s))   EKG, 12 LEAD, INITIAL    Collection Time: 04/22/21 10:35 PM   Result Value Ref Range    Ventricular Rate 89 BPM    Atrial Rate 89 BPM    P-R Interval 142 ms    QRS Duration 84 ms    Q-T Interval 362 ms    QTC Calculation (Bezet) 440 ms    Calculated P Axis 20 degrees    Calculated R Axis 9 degrees    Calculated T Axis -126 degrees    Diagnosis       Normal sinus rhythm  T wave abnormality, consider inferolateral ischemia  Abnormal ECG  Confirmed by Tadeo Stern MD, [facility] ([id number]) on 4/23/2021 12:10:17 AM     CBC WITH AUTOMATED DIFF    Collection Time: 04/22/21 10:40 PM   Result Value Ref Range    WBC 8.7 4.6 - 13.2 K/uL    RBC 3.99 (L) 4.20 - 5.30 M/uL    HGB 11.6 (L) 12.0 - 16.0 g/dL    HCT 36.2 35.0 - 45.0 %    MCV 90.7 74.0 - 97.0 FL    MCH 29.1 24.0 - 34.0 PG    MCHC 32.0 31.0 - 37.0 g/dL    RDW 14.1 11.6 - 14.5 %    PLATELET 146 065 - 596 K/uL    MPV 11.1 9.2 - 11.8 FL    NEUTROPHILS 82 (H) 40 - 73 %    LYMPHOCYTES 13 (L) 21 - 52 %    MONOCYTES 4 3 - 10 %    EOSINOPHILS 0 0 - 5 %    BASOPHILS 0 0 - 2 %    ABS. NEUTROPHILS 7.1 1.8 - 8.0 K/UL    ABS. LYMPHOCYTES 1.1 0.9 - 3.6 K/UL    ABS. MONOCYTES 0.4 0.05 - 1.2 K/UL    ABS. EOSINOPHILS 0.0 0.0 - 0.4 K/UL    ABS. BASOPHILS 0.0 0.0 - 0.1 K/UL    DF AUTOMATED     METABOLIC PANEL, COMPREHENSIVE    Collection Time: 04/22/21 10:40 PM   Result Value Ref Range    Sodium 139 136 - 145 mmol/L    Potassium 3.6 3.5 - 5.5 mmol/L    Chloride 105 100 - 111 mmol/L    CO2 27 21 - 32 mmol/L    Anion gap 7 3.0 - 18 mmol/L    Glucose 142 (H) 74 - 99 mg/dL    BUN 17 7.0 - 18 MG/DL    Creatinine 0.81 0.6 - 1.3 MG/DL    BUN/Creatinine ratio 21 (H) 12 - 20      GFR est AA >60 >60 ml/min/1.73m2    GFR est non-AA >60 >60 ml/min/1.73m2    Calcium 9.1 8.5 - 10.1 MG/DL    Bilirubin, total 0.9 0.2 - 1.0 MG/DL    ALT (SGPT) 33 13 - 56 U/L    AST (SGOT) 33 10 - 38 U/L    Alk.  phosphatase 102 45 - 117 U/L    Protein, total 6.3 (L) 6.4 - 8.2 g/dL    Albumin 3.4 3.4 - 5.0 g/dL Globulin 2.9 2.0 - 4.0 g/dL    A-G Ratio 1.2 0.8 - 1.7     MAGNESIUM    Collection Time: 04/22/21 10:40 PM   Result Value Ref Range    Magnesium 2.0 1.6 - 2.6 mg/dL   CARDIAC PANEL,(CK, CKMB & TROPONIN)    Collection Time: 04/22/21 10:40 PM   Result Value Ref Range    CK - MB 1.3 <3.6 ng/ml    CK-MB Index 0.9 0.0 - 4.0 %     26 - 192 U/L    Troponin-I, QT 0.10 (H) 0.0 - 0.045 NG/ML   NT-PRO BNP    Collection Time: 04/22/21 10:40 PM   Result Value Ref Range    NT pro-BNP 1,680 (H) 0 - 900 PG/ML   PROTHROMBIN TIME + INR    Collection Time: 04/22/21 10:40 PM   Result Value Ref Range    Prothrombin time 14.6 11.5 - 15.2 sec    INR 1.2 0.8 - 1.2     PTT    Collection Time: 04/22/21 10:40 PM   Result Value Ref Range    aPTT 31.3 23.0 - 36.4 SEC   COVID-19 RAPID TEST    Collection Time: 04/22/21 11:35 PM   Result Value Ref Range    Specimen source Nasopharyngeal      COVID-19 rapid test Not detected NOTD     CARDIAC PANEL,(CK, CKMB & TROPONIN)    Collection Time: 04/23/21  5:46 AM   Result Value Ref Range    CK - MB <1.0 <3.6 ng/ml    CK-MB Index  0.0 - 4.0 %     CALCULATION NOT PERFORMED WHEN RESULT IS BELOW LINEAR LIMIT     26 - 192 U/L    Troponin-I, QT 0.06 (H) 0.0 - 0.045 NG/ML   SARS-COV-2    Collection Time: 04/23/21  5:50 AM   Result Value Ref Range    SARS-CoV-2 Please find results under separate order       Results  XR CHEST PORT (Accession [id number]) (Order [id number])  Allergies     Not Specified: Keflex [Cephalexin]   Exam Information    Status Exam Begun  Exam Ended    Final [99] 4/22/2021 23:00 4/22/2021 11:12 PM [id number] 11:12 PM   Result Information    Status: Final result (Exam End: 4/22/2021 23:12) Provider Status: Open   Study Result    EXAM: FRONTAL CHEST RADIOGRAPH     CLINICAL INDICATION/HISTORY: Cough     COMPARISON: 10/10/2010     TECHNIQUE: Frontal view of the chest     _______________     FINDINGS:     HEART AND MEDIASTINUM: Unremarkable.     LUNGS AND PLEURAL SPACES: Heterogeneous opacities in both lung bases, more dense  on the right. No visible pleural abnormalities.     BONY THORAX AND SOFT TISSUES: Unremarkable.     _______________     IMPRESSION     Right greater than left basilar pulmonary opacities, suspicious for pneumonia in  the setting of cough. Results  CTA CHEST W OR W WO CONT (Accession [id number]) (Order [id number])  Allergies     Not Specified: Keflex [Cephalexin]   Exam Information    Status Exam Begun  Exam Ended    Final [99] 4/23/2021 01:15 4/23/2021  2:14 AM [id number]  2:14 AM   Result Information    Status: Final result (Exam End: 4/23/2021 02:14) Provider Status: Open   Study Result    EXAM: CTA CHEST W OR W WO CONT     CLINICAL INDICATION/HISTORY:  Shortness of breath   -Additional: None     COMPARISON: Same day radiograph     TECHNIQUE: Helical CT imaging from the thoracic inlet through the diaphragm with  intravenous contrast. Coronal and sagittal MIP reformats were generated. One or  more dose reduction techniques were used on this CT: automated exposure control,  adjustment of the mAs and/or kVp according to patient size, and iterative  reconstruction techniques. The specific techniques used on this CT exam have  been documented in the patient's electronic medical record. Digital Imaging and  Communications in Medicine (DICOM) format image data are available to  nonaffiliated external healthcare facilities or entities on a secure, media  free, reciprocally searchable basis with patient authorization for at least a  12-month period after this study.     _______________     FINDINGS:     EXAM QUALITY: Adequate.     PULMONARY ARTERIES: No pulmonary artery filling defects. Normal diameter of the  main PA.      MEDIASTINUM: Normal heart size. Aorta is unremarkable.  No pericardial effusion.     LUNGS: Regions of consolidation and groundglass opacification symmetrically  affecting the mid and lower lungs.     PLEURA: Normal.     AIRWAY: Normal.     LYMPH NODES:  No enlarged nodes.     UPPER ABDOMEN: Evidence of prior gastric sleeve procedure. Otherwise  unremarkable.     OSSEOUS: No acute or aggressive osseous abnormalities identified.     OTHER: None.     _______________     IMPRESSION     No evidence of pulmonary embolism. Lower lung consolidations and groundglass  opacification consistent with multifocal pneumonia.

## 2021-04-23 NOTE — PROGRESS NOTES
Hospitalist Progress Note    Patient: Suzette Nixon MRN: [id number]  CSN: [id number]    YOB: 1960  Age: 61 y.o. Sex: female    DOA: 4/22/2021 LOS:  LOS: 0 days          Chief Complaint:  SOB        Assessment/Plan        61year-old female with chronic pain syndrome, paroxysmal atrial fibrillation, and obesity is admitted with elevated troponin, community-acquired pneumonia, groundglass opacities of the lungs    Rapid covid neg, PCR pending, isolation for now  Check D dimer, ferritin, fibrinogen     Elevated troponin-suspect due to demand ischemia from pneumonia  Echo result pending    Community-acquired pneumonia with groundglass opacities on CXR/CT     Paroxysmal atrial fibrillation  Metoprolol p.o. as needed     Fall at home due to acquired right foot drop-ambulates with a walker  PT evaluation    Chronic pain syndrome-due to multiple back procedures in the past, spinal cord stimulator in place       DVT prophylaxis -lovenox    PT consult            Disposition :  Patient Active Problem List   Diagnosis Code    Paroxysmal atrial fibrillation (HCC) I48.0    Chronic pain syndrome G89.4    Chronic back pain M54.9, G89.29    Pneumonia J18.9    Obesity (BMI 30-39. 9) E66.9    Fall at home [address] [place], Y92.009    Acquired right foot drop M21.371    Elevated troponin R77.8    Suspected COVID-19 virus infection Z20.822       Subjective:    Feels weak  No CP  Had fever prior to coming to ER  Denies it now  No inc SOB  No prod cough    Review of systems:    Constitutional: denies fevers, chills  Cardiovascular: denies chest pain, palpitations  Gastrointestinal: denies nausea, vomiting, diarrhea      Vital signs/Intake and Output:  Visit Vitals  BP (!) 111/57   Pulse 65   Temp 97.3 °F (36.3 °C)   Resp 17   Ht 5' 4\" (1.626 m)   Wt 81.6 kg (180 lb)   SpO2 99%   BMI 30.90 kg/m²     Current Shift:  No intake/output data recorded.   Last three shifts:  No intake/output data recorded. Exam:    General: Well developed, alert, NAD, OX3  CVS:Regular rate and rhythm, no M/R/G, S1/S2 heard, no thrill  Lungs:Clear to auscultation bilaterally, no wheezes, rhonchi, or rales  Abdomen: Soft, Nontender, No distention, Normal Bowel sounds, No hepatomegaly  Extremities: No C/C/E, pulses palpable 2+  Neuro:grossly normal , follows commands  Psych:appropriate                Labs: Results:       Chemistry Recent Labs     04/22/21  2240   *      K 3.6      CO2 27   BUN 17   CREA 0.81   CA 9.1   AGAP 7   BUCR 21*      TP 6.3*   ALB 3.4   GLOB 2.9   AGRAT 1.2      CBC w/Diff Recent Labs     04/22/21  2240   WBC 8.7   RBC 3.99*   HGB 11.6*   HCT 36.2      GRANS 82*   LYMPH 13*   EOS 0      Cardiac Enzymes Recent Labs     04/23/21  1035 04/23/21  0546   CPK 79 100   CKND1 1.9 CALCULATION NOT PERFORMED WHEN RESULT IS BELOW LINEAR LIMIT      Coagulation Recent Labs     04/22/21  2240   PTP 14.6   INR 1.2   APTT 31.3       Lipid Panel Lab Results   Component Value Date/Time    Cholesterol, total 180 04/23/2021 05:46 AM    HDL Cholesterol 103 (H) 04/23/2021 05:46 AM    LDL, calculated 67.6 04/23/2021 05:46 AM    VLDL, calculated 9.4 04/23/2021 05:46 AM    Triglyceride 47 04/23/2021 05:46 AM    CHOL/HDL Ratio 1.7 04/23/2021 05:46 AM      BNP No results for input(s): BNPP in the last 72 hours.    Liver Enzymes Recent Labs     04/22/21  2240   TP 6.3*   ALB 3.4         Thyroid Studies Lab Results   Component Value Date/Time    TSH 4.15 (H) 02/18/2015 04:15 AM        Procedures/imaging: see electronic medical records for all procedures/Xrays and details which were not copied into this note but were reviewed prior to creation of Flex Ferraro MD

## 2021-04-23 NOTE — PROGRESS NOTES
Assumed care of pt at this time. Assessment complete. Pt alert and oriented x 4. Showing no signs of distress. Denies SOB and chest pain. Pt lungs diminished bilaterally. Cap refill  less than 3 seconds. Pt has numbness and tingling to BLE. Pt states she always has this. Pt has a R foot droop. Pt has bruising to R shoulder and R hip. 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 Pt is on room air stating at 98%. Call light and possessions within reach. Bed in low position with wheels locked. Will continue to monitor. 1245-Pt is upset that she can't have any visitors due to covid results pending. Tivis Grams went in and talked to patient. 1548-Attempted to give requip patient states she takes 12mg once daily and 4mg 3 times a day. Paged Dr. Alicia Workman regarding above situation. 1556-Telephone order with readback to modify requip to 12mg daily at bedtime. Shift Summary-  Pt is alert and oriented x 4. Pt had uneventful shift. Pt ambulating and voiding sufficient amounts. Pt is upset and stated that \"staff lied to her about visitors\". Pt is unable to have visitors due to patient being a covid rule out. Nurse explained this and so did Jacquelyn(the manager).

## 2021-04-23 NOTE — ED NOTES
TRANSFER - OUT REPORT:    Verbal report given to NELSON Myers(name) on Franco Miranda  being transferred to [address] [place] [address](unit) for routine progression of care       Report consisted of patients Situation, Background, Assessment and   Recommendations(SBAR). Information from the following report(s) SBAR, Kardex, ED Summary, [facility] and Recent Results was reviewed with the receiving nurse. Lines:   Peripheral IV 04/22/21 Left Antecubital (Active)   Site Assessment Clean, dry, & intact 04/22/21 2248   Phlebitis Assessment 0 04/22/21 2248   Infiltration Assessment 0 04/22/21 2248   Dressing Status Clean, dry, & intact 04/22/21 2248   Dressing Type Transparent 04/22/21 2248   Hub Color/Line Status Pink 04/22/21 2248        Opportunity for questions and clarification was provided.       Patient transported with:   Registered Nurse

## 2021-04-23 NOTE — PROGRESS NOTES
Problem: Mobility Impaired (Adult and Pediatric)  Goal: *Acute Goals and Plan of Care (Insert Text)  Description: No goals necessary at this time. Outcome: Resolved/Met  PHYSICAL THERAPY EVALUATION/DISCHARGE    Patient: Jennifer Newton (34 y.o. female)  Date: 4/23/2021  Primary Diagnosis: CAP (community acquired pneumonia) [J18.9]  Ground glass opacity present on imaging of lung [R91.8]  Precautions:   Contact, Other (comment)(Droplet plus)  PLOF: amb with \"stick\" PTA without assist    ASSESSMENT :  Based on the objective data described below, the patient is seen on telemetry unit in [address]. Pt expressed being upset that she is still in hospital/isolation, that spouse is not able to visit her and states she is not putting on a mask (after being asked to) b/c she already tested negative. Pt reports pain 1/10 general all over which she usually has. Demonstrates bed mobility with mod I. Demonstrates transfers STS with SBA as pt usually uses cane. Able to ambulate in room with min HHA (no cane available), foot drop R. Returned to bed and left with all needs in reach. O2 sat pre/post activity mid 94-96% on RA. Answered pt questions regarding IPPT. No further skilled PT indicated at this time and will discontinue PT  No skilled intervention indicated.     Pt Education: Role of physical therapy in acute care setting, fall prevention and safety/technique during functional mobility tasks    Patient's rehabilitation potential is considered to be Good  Factors which may influence rehabilitation potential include:   [x]         None noted  []         Mental ability/status  []         Medical condition  []         Home/family situation and support systems  []         Safety awareness  []         Pain tolerance/management  []         Other:      PLAN :  Recommendations and Planned Interventions:   []           Bed Mobility Training             []    Neuromuscular Re-Education  []           Transfer Training []    Orthotic/Prosthetic Training  []           Gait Training                          []    Modalities  []           Therapeutic Exercises           []    Edema Management/Control  []           Therapeutic Activities            []    Family Training/Education  []           Patient Education  [x]           Other (comment):NA  Frequency/Duration: N/A  Discharge Recommendations: None  Further Equipment Recommendations for Discharge: N/A     SUBJECTIVE:   Patient stated What do I have to do?     OBJECTIVE DATA SUMMARY:     Past Medical History:   Diagnosis Date    Arrhythmia     hx of afib-due to stress and history of tachycardia    Arthritis     osteo-both hands, back    CAD (coronary artery disease)     Pt reports that in the past EKG shows that she had a heart attack-but all testing was neg    Chronic pain     lower back    MRSA (methicillin resistant Staphylococcus aureus) colonization 2016    Narcolepsy     Nausea & vomiting     Restless legs syndrome     Shortness of breath      Past Surgical History:   Procedure Laterality Date    HX  SECTION      x 2    HX GASTRIC BYPASS      gastric sleeve    HX GYN      ectopic preg    HX ORTHOPAEDIC      HX OTHER SURGICAL      gastric sleeve    HX SEPTOPLASTY      HX TUBAL LIGATION      NEUROLOGICAL PROCEDURE UNLISTED      back surgery x 2    NEUROLOGICAL PROCEDURE UNLISTED      neck surgery    NEUROLOGICAL PROCEDURE UNLISTED      spinal cord stimulator     Barriers to Learning/Limitations: None  Compensate with: N/A  Home Situation:  Home Situation  Home Environment: Private residence  # Steps to Enter: 4  Rails to Enter: Yes  Hand Rails : Bilateral  One/Two Story Residence: Two story  # of Interior Steps: 15  Interior Rails: Left  Lift Chair Available: No  Living Alone: No  Support Systems: Spouse/Significant Other/Partner  Patient Expects to be Discharged to[de-identified] Private residence  Current DME Used/Available at Home: Wheelchair, Commode, bedside, Shower chair, Grab bars(amb's with stick; BSC over toilet)  Tub or Shower Type: Tub/Shower combination  Critical Behavior:  Neurologic State: Alert  Orientation Level: Oriented X4  Psychosocial  Patient Behaviors: Calm; Cooperative  Needs Expressed: Educational  Purposeful Interaction: Yes  Pt Identified Daily Priority: Clinical issues (comment)  Caritas Process: Nurture loving kindness;Establish trust;Teaching/learning; Attend basic human needs;Create healing environment  Caring Interventions: Reassure; Therapeutic modalities  Reassure: Therapeutic listening; Informing  Therapeutic Modalities: Intentional therapeutic touch; Deep breathing  Skin Condition/Temp: Cool  Skin Integrity: Intact  Skin Integumentary  Skin Color: Ecchymosis (comment);Pale;Black(black resin on hands)  Skin Condition/Temp: Cool  Skin Integrity: Intact  Turgor: Non-tenting  Hair Growth: Absent  Varicosities: Present  Nails: Exceptions to WDL  Exceptions to WDL: Thick  Strength:    Strength: Generally decreased, functional, foot drop R  Tone & Sensation:   Sensation: Intact  Range Of Motion:  AROM: Generally decreased, functional(foot drop right)  Functional Mobility:  Bed Mobility:  Supine to Sit: Modified independent  Sit to Supine: Modified independent  Scooting: Modified independent  Transfers:  Sit to Stand: Stand-by assistance  Stand to Sit: Stand-by assistance  Balance:   Sitting: Intact  Standing: Intact; With support  Ambulation/Gait Training:  Distance (ft): 20 Feet (ft)  Assistive Device: Gait belt  Ambulation - Level of Assistance: Minimal assistance(HHA as pt normally use cane)  Gait Description (WDL): Exceptions to WDL  Gait Abnormalities: Foot drop(R)  Speed/Stephanie: Pace decreased (<100 feet/min)  Step Length: Left shortened;Right shortened   Pain:  Pain level pre-treatment: 1/10   Pain level post-treatment: 1/10   Pain Intervention(s) : Medication (see MAR);  Rest, Ice, Repositioning  Response to intervention: Nurse notified, See doc flow    Activity Tolerance:   Good   Please refer to the flowsheet for vital signs taken during this treatment. After treatment:   []         Patient left in no apparent distress sitting up in chair  [x]         Patient left in no apparent distress in bed  [x]         Call bell left within reach  [x]         Nursing notified  []         Caregiver present  []         Bed alarm activated  []         SCDs applied    COMMUNICATION/EDUCATION:   [x]         Role of Physical Therapy in the acute care setting. [x]         Fall prevention education was provided and the patient/caregiver indicated understanding. []         Patient/family have participated as able in goal setting and plan of care. []         Patient/family agree to work toward stated goals and plan of care. [x]         Patient understands intent and goals of therapy, but is neutral about his/her participation. []         Patient is unable to participate in goal setting/plan of care: ongoing with therapy staff.  []         Other:     Thank you for this referral.  Vasiliy Edwards, PT   Time Calculation: 16 mins      Eval Complexity: History: HIGH Complexity :3+ comorbidities / personal factors will impact the outcome/ POC Exam:LOW Complexity : 1-2 Standardized tests and measures addressing body structure, function, activity limitation and / or participation in recreation  Presentation: LOW Complexity : Stable, uncomplicated  Clinical Decision Making:Low Complexity    Overall Complexity:LOW

## 2021-04-23 NOTE — ROUTINE PROCESS
TRANSFER - IN REPORT:    Verbal report received from Lucia Hoyt RN (name) on Shante Granado being received from ED (unit) for routine progression of care      Report consisted of patients Situation, Background, Assessment and   Recommendations(SBAR). Information from the following report(s) SBAR, Kardex, ED Summary, Intake/Output, MAR, Recent Results and Quality Measures was reviewed with the receiving nurse. Opportunity for questions and clarification was provided. Assessment completed upon patients arrival to unit and care assumed.

## 2021-04-23 NOTE — PROGRESS NOTES
Reason for Admission: Elevated troponin    Chart reviewed. Per H&P: Mamadou Gutierrez is a 61 y.o. female with chronic pain syndrome, paroxysmal atrial fibrillation, and obesity presents to the ED with worsening cough and shortness of breath since yesterday. She reports a years long history of dyspnea on exertion especially when climbing the stairs. She has not had recent cardiac stress testing. She denies any fever but has had cough and diarrhea. She has not received her Covid vaccines. She reports she had a severe pneumonia in 1975 that required hospital admission. She uses an inhaler as needed for shortness of breath with exertion but reports that her pulmonary work-up has been negative. She states that she has had multiple lung studies done but do not show evidence of asthma or COPD. 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 She does not usually wear a mask when using ammonia, and has been doing this for the past 1.5 years. She also reports that she has a chronic right foot drop and she caught her foot on a rug yesterday and fell. She does not usually use a walker around the house but sometimes uses a cane. She denies any bowel or bladder incontinence. \"    Care Management/Patient Conversation: 1400: Chart reviewed. CM spoke with the patient and informed her of the CM's role. The patient confirmed demographics and that she currently sees Amaury Maldonado for primary care, but would like CM to find a new PCP for her. CM and the pateint discussed discharge plans to include transportation upon discharge, DME, family support, and transportation to and from appointments. The patient states that she currently lives at home with her  and that she was independent prior to this admission, driving herself places and using a cane for ambulation. The patient states that her  will be able to pick her up when she is discharged.  The patient denies any additional questions or concerns at this time. CM to follow the patient's progress and be available to assist with discharge planning as needed. CMS referral placed. Prior to admission patient was living: Home with     Prior to admission patient was using the following DME: [address], wheelchair if needed                    RUR Score: 10%                   Plan for utilizing home health: TBD         PCP: First and Last name: Patient would like a new PCP, doesn't want [facility]   Name of Practice:    Are you a current patient: Yes/No:    Approximate date of last visit:    Can you participate in a virtual visit with your PCP:                     Current Advanced Directive/Advance Care Plan: Partial Code has ACP docs    Healthcare Decision Maker:   Primary Decision Maker: Donte Hiu - [phone]                         Transition of Care Plan: In progress, likely home with family assist and MD follow-up       Care Management Interventions  PCP Verified by CM: Yes(wants a new PCP, CM to assist)  Mode of Transport at Discharge:  Other (see comment)()  Transition of Care Consult (CM Consult): Discharge Planning  Current Support Network: Lives with Spouse  Confirm Follow Up Transport: Family  The Plan for Transition of Care is Related to the Following Treatment Goals : Elevated troponin  Discharge Location  Discharge Placement: Home with family assistance

## 2021-04-24 LAB
ALBUMIN SERPL-MCNC: 2.7 G/DL (ref 3.4–5)
ALBUMIN/GLOB SERPL: 1 {RATIO} (ref 0.8–1.7)
ALP SERPL-CCNC: 89 U/L (ref 45–117)
ALT SERPL-CCNC: 22 U/L (ref 13–56)
ANION GAP SERPL CALC-SCNC: 4 MMOL/L (ref 3–18)
AST SERPL-CCNC: 18 U/L (ref 10–38)
BILIRUB SERPL-MCNC: 0.3 MG/DL (ref 0.2–1)
BUN SERPL-MCNC: 13 MG/DL (ref 7–18)
BUN/CREAT SERPL: 24 (ref 12–20)
CALCIUM SERPL-MCNC: 8.5 MG/DL (ref 8.5–10.1)
CHLORIDE SERPL-SCNC: 109 MMOL/L (ref 100–111)
CO2 SERPL-SCNC: 29 MMOL/L (ref 21–32)
CREAT SERPL-MCNC: 0.54 MG/DL (ref 0.6–1.3)
ECHO AV ANNULUS DIAM: 2.38 CM
ECHO AV AREA PEAK VELOCITY: 2.32 CM2
ECHO AV AREA VTI: 2.29 CM2
ECHO AV AREA/BSA PEAK VELOCITY: 1.2 CM2/M2
ECHO AV AREA/BSA VTI: 1.2 CM2/M2
ECHO AV MEAN GRADIENT: 5.96 MMHG
ECHO AV PEAK GRADIENT: 10.53 MMHG
ECHO AV PEAK VELOCITY: 162 CM/S
ECHO AV VTI: 32.9 CM
ECHO EST RA PRESSURE: 3 MMHG
ECHO IVC PROX: 2 CM
ECHO LA VOL 2C: 52.42 ML (ref 22–52)
ECHO LA VOL 4C: 36.8 ML (ref 22–52)
ECHO LA VOL BP: 46.72 ML (ref 22–52)
ECHO LV E' LATERAL VELOCITY: 11 CM/S
ECHO LV E' SEPTAL VELOCITY: 8 CM/S
ECHO LV EDV A2C: 92.93 ML
ECHO LV EDV A4C: 46.17 ML
ECHO LV EDV BP: 70.19 ML (ref 56–104)
ECHO LV EJECTION FRACTION A2C: 66 %
ECHO LV EJECTION FRACTION A4C: 56 %
ECHO LV EJECTION FRACTION BIPLANE: 60.8 % (ref 55–100)
ECHO LV ESV A2C: 32.05 ML
ECHO LV ESV A4C: 20.34 ML
ECHO LV ESV BP: 27.53 ML (ref 19–49)
ECHO LV INTERNAL DIMENSION DIASTOLIC: 4.83 CM (ref 3.9–5.3)
ECHO LV INTERNAL DIMENSION SYSTOLIC: 3.2 CM
ECHO LV IVSD: 1.14 CM (ref 0.6–0.9)
ECHO LV MASS 2D: 176.6 G (ref 67–162)
ECHO LV MASS INDEX 2D: 94.4 G/M2 (ref 43–95)
ECHO LV POSTERIOR WALL DIASTOLIC: 0.9 CM (ref 0.6–0.9)
ECHO LVOT CARDIAC OUTPUT: 14.94 L/MIN
ECHO LVOT DIAM: 1.98 CM
ECHO LVOT PEAK GRADIENT: 5.96 MMHG
ECHO LVOT PEAK VELOCITY: 122 CM/S
ECHO LVOT SV: 75.4 ML
ECHO LVOT VTI: 24.48 CM
ECHO MV A VELOCITY: 71 CM/S
ECHO MV AREA PHT: 3.69 CM2
ECHO MV E DECELERATION TIME (DT): 205.77 MS
ECHO MV E VELOCITY: 67 CM/S
ECHO MV E/A RATIO: 0.94
ECHO MV E/E' LATERAL: 6.09
ECHO MV E/E' RATIO (AVERAGED): 7.23
ECHO MV E/E' SEPTAL: 8.38
ECHO MV PRESSURE HALF TIME (PHT): 59.67 MS
ECHO RA AREA 4C: 16.21 CM2
ECHO RV INTERNAL DIMENSION: 3.07 CM
ECHO RV TAPSE: 2.5 CM (ref 1.5–2)
ECHO TV REGURGITANT MAX VELOCITY: 251 CM/S
ECHO TV REGURGITANT PEAK GRADIENT: 25.25 MMHG
ERYTHROCYTE [DISTWIDTH] IN BLOOD BY AUTOMATED COUNT: 14.4 % (ref 11.6–14.5)
GLOBULIN SER CALC-MCNC: 2.8 G/DL (ref 2–4)
GLUCOSE SERPL-MCNC: 126 MG/DL (ref 74–99)
HCT VFR BLD AUTO: 31.4 % (ref 35–45)
HGB BLD-MCNC: 9.8 G/DL (ref 12–16)
LVOT MG: 2.99 MMHG
MCH RBC QN AUTO: 29.3 PG (ref 24–34)
MCHC RBC AUTO-ENTMCNC: 31.2 G/DL (ref 31–37)
MCV RBC AUTO: 93.7 FL (ref 74–97)
PLATELET # BLD AUTO: 129 K/UL (ref 135–420)
PMV BLD AUTO: 12 FL (ref 9.2–11.8)
POTASSIUM SERPL-SCNC: 3.9 MMOL/L (ref 3.5–5.5)
PROT SERPL-MCNC: 5.5 G/DL (ref 6.4–8.2)
RBC # BLD AUTO: 3.35 M/UL (ref 4.2–5.3)
SARS-COV-2, COV2NT: NOT DETECTED
SODIUM SERPL-SCNC: 142 MMOL/L (ref 136–145)
WBC # BLD AUTO: 6.4 K/UL (ref 4.6–13.2)

## 2021-04-24 PROCEDURE — 36415 COLL VENOUS BLD VENIPUNCTURE: CPT

## 2021-04-24 PROCEDURE — 74011250636 HC RX REV CODE- 250/636: Performed by: FAMILY MEDICINE

## 2021-04-24 PROCEDURE — 80053 COMPREHEN METABOLIC PANEL: CPT

## 2021-04-24 PROCEDURE — 74011250637 HC RX REV CODE- 250/637: Performed by: FAMILY MEDICINE

## 2021-04-24 PROCEDURE — 85027 COMPLETE CBC AUTOMATED: CPT

## 2021-04-24 PROCEDURE — 65660000000 HC RM CCU STEPDOWN

## 2021-04-24 PROCEDURE — 74011250637 HC RX REV CODE- 250/637: Performed by: HOSPITALIST

## 2021-04-24 PROCEDURE — 74011250636 HC RX REV CODE- 250/636: Performed by: HOSPITALIST

## 2021-04-24 PROCEDURE — 74011000250 HC RX REV CODE- 250: Performed by: FAMILY MEDICINE

## 2021-04-24 RX ORDER — LEVOFLOXACIN 5 MG/ML
500 INJECTION, SOLUTION INTRAVENOUS EVERY 24 HOURS
Status: DISCONTINUED | OUTPATIENT
Start: 2021-04-25 | End: 2021-04-25

## 2021-04-24 RX ADMIN — FAMOTIDINE 20 MG: 10 INJECTION INTRAVENOUS at 20:19

## 2021-04-24 RX ADMIN — CHOLECALCIFEROL TAB 125 MCG (5000 UNIT) 5000 UNITS: 125 TAB at 08:49

## 2021-04-24 RX ADMIN — CEFTRIAXONE 1 G: 1 INJECTION, POWDER, FOR SOLUTION INTRAMUSCULAR; INTRAVENOUS at 01:22

## 2021-04-24 RX ADMIN — ONDANSETRON 4 MG: 2 INJECTION INTRAMUSCULAR; INTRAVENOUS at 01:42

## 2021-04-24 RX ADMIN — CYANOCOBALAMIN TAB 1000 MCG 5000 MCG: 1000 TAB at 08:49

## 2021-04-24 RX ADMIN — ASPIRIN 81 MG: 81 TABLET, CHEWABLE ORAL at 08:49

## 2021-04-24 RX ADMIN — FAMOTIDINE 20 MG: 10 INJECTION INTRAVENOUS at 08:50

## 2021-04-24 RX ADMIN — Medication 10 ML: at 14:00

## 2021-04-24 RX ADMIN — LEVOFLOXACIN 500 MG: 5 INJECTION, SOLUTION INTRAVENOUS at 08:50

## 2021-04-24 RX ADMIN — ENOXAPARIN SODIUM 40 MG: 40 INJECTION SUBCUTANEOUS at 08:50

## 2021-04-24 NOTE — PROGRESS NOTES
Hospitalist Progress Note    Patient: Nathan Ngo MRN: [id number]  CSN: [id number]    YOB: 1960  Age: 61 y.o. Sex: female    DOA: 4/22/2021 LOS:  LOS: 1 day                Assessment and Plan:    Principal Problem:    Elevated troponin (4/23/2021)    Active Problems:    Paroxysmal atrial fibrillation ([address] (2/18/2015)      Chronic pain syndrome (2/3/2016)      Chronic back pain (2/3/2016)      Pneumonia (4/23/2021)      Obesity (BMI 30-39.9) (4/23/2021)      Fall at home (4/23/2021)      Acquired right foot drop (4/23/2021)      Suspected COVID-19 virus infection (4/23/2021)        PNA:  on antibiotics, covid pending  Likely from chemical exposure and dosnt want pulmonary work up    PAF:  Rate control with metoprolol. Echo I snormal          Chief complaint:  Worsening shortness of breath      Subjective:    She feels good overall  Wants to go home. Shortness of breath is much improved        Review of systems:    General: No fevers or chills. Cardiovascular: No chest pain or pressure. No palpitations. Pulmonary: No shortness of breath. Gastrointestinal: No nausea, vomiting. Objective:    Vital signs/Intake and Output:  Visit Vitals  /68   Pulse 76   Temp 97.6 °F (36.4 °C)   Resp 20   Ht 5' 4\" (1.626 m)   Wt 99.5 kg (219 lb 4.8 oz)   SpO2 99%   BMI 37.64 kg/m²     Current Shift:  04/24 0701 - 04/24 1900  In: 110 [I.V.:110]  Out: -   Last three shifts:  04/22 1901 - 04/24 0700  In: 0   Out: 60 [Urine:60]    Physical Exam:  General: NAD, AAOx3. Non-toxic. HEENT: NC/AT. PERRLA, EOMI.  MMM. Lungs: Nml inspection. CTA B/L. No wheezing, rales or rhonchi. Heart:  S1S2 RRR,  PMI mid 5th IC space. No M/RG. Abdomen: Soft, NT/ND.  BS+. No peritoneal signs. Extremities: No C/C/E. Psych:   Nml affect. Neurologic:  2-12 intact. Strength 5/5 throughout.   Sensation symmetrical.          Labs: Results:       Chemistry Recent Labs     04/24/21  0425 04/22/21  2240   * 142*   NA 142 139   K 3.9 3.6    105   CO2 29 27   BUN 13 17   CREA 0.54* 0.81   CA 8.5 9.1   AGAP 4 7   BUCR 24* 21*   AP 89 102   TP 5.5* 6.3*   ALB 2.7* 3.4   GLOB 2.8 2.9   AGRAT 1.0 1.2      CBC w/Diff Recent Labs     04/24/21  0425 04/22/21  2240   WBC 6.4 8.7   RBC 3.35* 3.99*   HGB 9.8* 11.6*   HCT 31.4* 36.2   * 165   GRANS  --  82*   LYMPH  --  13*   EOS  --  0      Cardiac Enzymes Recent Labs     04/23/21  1615 04/23/21  1035   CPK 71 79   CKND1 1.5 1.9      Coagulation Recent Labs     04/22/21  2240   PTP 14.6   INR 1.2   APTT 31.3       Lipid Panel Lab Results   Component Value Date/Time    Cholesterol, total 180 04/23/2021 05:46 AM    HDL Cholesterol 103 (H) 04/23/2021 05:46 AM    LDL, calculated 67.6 04/23/2021 05:46 AM    VLDL, calculated 9.4 04/23/2021 05:46 AM    Triglyceride 47 04/23/2021 05:46 AM    CHOL/HDL Ratio 1.7 04/23/2021 05:46 AM      BNP No results for input(s): BNPP in the last 72 hours.    Liver Enzymes Recent Labs     04/24/21  0425   TP 5.5*   ALB 2.7*   AP 89      Thyroid Studies Lab Results   Component Value Date/Time    TSH 4.15 (H) 02/18/2015 04:15 AM        Procedures/imaging: see electronic medical records for all procedures/Xrays and details which were not copied into this note but were reviewed prior to creation of Plan

## 2021-04-24 NOTE — PROGRESS NOTES
1908 Assumed patient care at this time. Report received from Travis Dunlap, [address] Patient called up to nurse's station very angry as her  is downstairs and security is telling her that she cannot have any visitors. Patient threatening to leave if her  cannot come upstairs. Brian Alvarez PerfectServed Dr. Isabel Roth to see if droplet precautions can be removed. 2015 Patient moved to room 331. Tele box 1 applied. 2022 Shift assessment completed and documented in flow sheets. 0666 Bedside and verbal shift change report given to Travis Dunlap RN (oncoming nurse) by Teresa Ballesteros RN (offgoing nurse). Report included the following information: SBAR, Kardex, MAR, and recent results.

## 2021-04-24 NOTE — PROGRESS NOTES
Problem: Falls - Risk of  Goal: *Absence of Falls  Description: Document Misael Devlin Fall Risk and appropriate interventions in the flowsheet.   Outcome: Progressing Towards Goal  Note: Fall Risk Interventions:  Mobility Interventions: Assess mobility with egress test, Bed/chair exit alarm, Communicate number of staff needed for ambulation/transfer, OT consult for ADLs, Patient to call before getting OOB, PT Consult for mobility concerns, PT Consult for assist device competence, Strengthening exercises (ROM-active/passive), Utilize walker, cane, or other assistive device         Medication Interventions: Evaluate medications/consider consulting pharmacy, Patient to call before getting OOB, Teach patient to arise slowly    Elimination Interventions: Bed/chair exit alarm, Call light in reach, Elevated toilet seat, Patient to call for help with toileting needs, Stay With Me (per policy), Toilet paper/wipes in reach, Toileting schedule/hourly rounds    History of Falls Interventions: Bed/chair exit alarm, Consult care management for discharge planning, Evaluate medications/consider consulting pharmacy, Investigate reason for fall, Assess for delayed presentation/identification of injury for 48 hrs (comment for end date), Vital signs minimum Q4HRs X 24 hrs (comment for end date)         Problem: Patient Education: Go to Patient Education Activity  Goal: Patient/Family Education  Outcome: Progressing Towards Goal     Problem: Airway Clearance - Ineffective  Goal: Achieve or maintain patent airway  Outcome: Progressing Towards Goal     Problem: Airway Clearance - Ineffective  Goal: Achieve or maintain patent airway  Outcome: Progressing Towards Goal     Problem: Gas Exchange - Impaired  Goal: Absence of hypoxia  Outcome: Progressing Towards Goal  Goal: Promote optimal lung function  Outcome: Progressing Towards Goal     Problem: Breathing Pattern - Ineffective  Goal: Ability to achieve and maintain a regular respiratory rate  Outcome: Progressing Towards Goal     Problem: Body Temperature -  Risk of, Imbalanced  Goal: Ability to maintain a body temperature within defined limits  Outcome: Progressing Towards Goal  Goal: Will regain or maintain usual level of consciousness  Outcome: Progressing Towards Goal  Goal: Complications related to the disease process, condition or treatment will be avoided or minimized  Outcome: Progressing Towards Goal     Problem: Isolation Precautions - Risk of Spread of Infection  Goal: Prevent transmission of infectious organism to others  Outcome: Progressing Towards Goal     Problem: Nutrition Deficits  Goal: Optimize nutrtional status  Outcome: Progressing Towards Goal     Problem: Risk for Fluid Volume Deficit  Goal: Maintain normal heart rhythm  Outcome: Progressing Towards Goal  Goal: Maintain absence of muscle cramping  Outcome: Progressing Towards Goal  Goal: Maintain normal serum potassium, sodium, calcium, phosphorus, and pH  Outcome: Progressing Towards Goal     Problem: Loneliness or Risk for Loneliness  Goal: Demonstrate positive use of time alone when socialization is not possible  Outcome: Progressing Towards Goal     Problem: Fatigue  Goal: Verbalize increase energy and improved vitality  Outcome: Progressing Towards Goal     Problem: Patient Education: Go to Patient Education Activity  Goal: Patient/Family Education  Outcome: Progressing Towards Goal     Problem: Risk for Spread of Infection  Goal: Prevent transmission of infectious organism to others  Description: Prevent the transmission of infectious organisms to other patients, staff members, and visitors.   Outcome: Progressing Towards Goal     Problem: Patient Education:  Go to Education Activity  Goal: Patient/Family Education  Outcome: Progressing Towards Goal     Problem: Pain  Goal: *Control of Pain  Outcome: Progressing Towards Goal     Problem: Pressure Injury - Risk of  Goal: *Prevention of pressure injury  Description: Document Pj Scale and appropriate interventions in the flowsheet. Outcome: Progressing Towards Goal  Note: Pressure Injury Interventions: Activity Interventions: Assess need for specialty bed, Chair cushion, Increase time out of bed, Pressure redistribution bed/mattress(bed type), PT/OT evaluation    Mobility Interventions: Assess need for specialty bed, Chair cushion, Float heels, Pressure redistribution bed/mattress (bed type), PT/OT evaluation, Turn and reposition approx.  every two hours(pillow and wedges)    Nutrition Interventions: Offer support with meals,snacks and hydration, Discuss nutritional consult with provider, Document food/fluid/supplement intake                     Problem: Patient Education: Go to Patient Education Activity  Goal: Patient/Family Education  Outcome: Progressing Towards Goal

## 2021-04-24 NOTE — PROGRESS NOTES
0730: report given to this nurse from [address] [place] [address]    [phone]: MD Maximino Cadena made aware pt request visit, MD makes this nurse aware visit pending  Bassam Michel RN    [phone]: MD Alves visit noted with pt  Bassam Michel     [phone]: pt request to leave AMA, MD Alves made aware, MD fowarded to pt  Bassam Michel RN    6978: MD Maximino Cadena explains to pt condition and tx and importance of hospital stay, no AMA noted  Bassam Michel RN

## 2021-04-24 NOTE — ROUTINE PROCESS
Bedside and Verbal shift change report given to [facility], RN by Jerrica Singh RN. Report included the following information SBAR, Kardex, Intake/Output and MAR.

## 2021-04-25 VITALS
SYSTOLIC BLOOD PRESSURE: 140 MMHG | HEART RATE: 63 BPM | DIASTOLIC BLOOD PRESSURE: 67 MMHG | TEMPERATURE: 97.9 F | WEIGHT: 219.3 LBS | BODY MASS INDEX: 37.44 KG/M2 | RESPIRATION RATE: 18 BRPM | OXYGEN SATURATION: 98 % | HEIGHT: 64 IN

## 2021-04-25 LAB
ALBUMIN SERPL-MCNC: 2.9 G/DL (ref 3.4–5)
ALBUMIN/GLOB SERPL: 1 {RATIO} (ref 0.8–1.7)
ALP SERPL-CCNC: 98 U/L (ref 45–117)
ALT SERPL-CCNC: 25 U/L (ref 13–56)
ANION GAP SERPL CALC-SCNC: 5 MMOL/L (ref 3–18)
AST SERPL-CCNC: 21 U/L (ref 10–38)
BILIRUB SERPL-MCNC: 0.3 MG/DL (ref 0.2–1)
BUN SERPL-MCNC: 11 MG/DL (ref 7–18)
BUN/CREAT SERPL: 20 (ref 12–20)
CALCIUM SERPL-MCNC: 8.6 MG/DL (ref 8.5–10.1)
CHLORIDE SERPL-SCNC: 109 MMOL/L (ref 100–111)
CO2 SERPL-SCNC: 27 MMOL/L (ref 21–32)
CREAT SERPL-MCNC: 0.55 MG/DL (ref 0.6–1.3)
ERYTHROCYTE [DISTWIDTH] IN BLOOD BY AUTOMATED COUNT: 14 % (ref 11.6–14.5)
GLOBULIN SER CALC-MCNC: 3 G/DL (ref 2–4)
GLUCOSE SERPL-MCNC: 85 MG/DL (ref 74–99)
HCT VFR BLD AUTO: 33.5 % (ref 35–45)
HGB BLD-MCNC: 10.2 G/DL (ref 12–16)
MCH RBC QN AUTO: 28.7 PG (ref 24–34)
MCHC RBC AUTO-ENTMCNC: 30.4 G/DL (ref 31–37)
MCV RBC AUTO: 94.4 FL (ref 74–97)
PLATELET # BLD AUTO: 154 K/UL (ref 135–420)
PMV BLD AUTO: 11.9 FL (ref 9.2–11.8)
POTASSIUM SERPL-SCNC: 3.8 MMOL/L (ref 3.5–5.5)
PROT SERPL-MCNC: 5.9 G/DL (ref 6.4–8.2)
RBC # BLD AUTO: 3.55 M/UL (ref 4.2–5.3)
SODIUM SERPL-SCNC: 141 MMOL/L (ref 136–145)
WBC # BLD AUTO: 5.1 K/UL (ref 4.6–13.2)

## 2021-04-25 PROCEDURE — 80053 COMPREHEN METABOLIC PANEL: CPT

## 2021-04-25 PROCEDURE — 85027 COMPLETE CBC AUTOMATED: CPT

## 2021-04-25 PROCEDURE — 74011250636 HC RX REV CODE- 250/636: Performed by: INTERNAL MEDICINE

## 2021-04-25 PROCEDURE — 36415 COLL VENOUS BLD VENIPUNCTURE: CPT

## 2021-04-25 PROCEDURE — 74011250636 HC RX REV CODE- 250/636: Performed by: FAMILY MEDICINE

## 2021-04-25 PROCEDURE — 74011000250 HC RX REV CODE- 250: Performed by: FAMILY MEDICINE

## 2021-04-25 RX ORDER — LEVOFLOXACIN 500 MG/1
500 TABLET, FILM COATED ORAL EVERY 24 HOURS
Qty: 5 TAB | Refills: 0 | Status: SHIPPED | OUTPATIENT
Start: 2021-04-26

## 2021-04-25 RX ORDER — LEVOFLOXACIN 500 MG/1
500 TABLET, FILM COATED ORAL EVERY 24 HOURS
Status: DISCONTINUED | OUTPATIENT
Start: 2021-04-26 | End: 2021-04-25 | Stop reason: HOSPADM

## 2021-04-25 RX ADMIN — SODIUM CHLORIDE, SODIUM LACTATE, POTASSIUM CHLORIDE, AND CALCIUM CHLORIDE 100 ML/HR: 600; 310; 30; 20 INJECTION, SOLUTION INTRAVENOUS at 06:15

## 2021-04-25 RX ADMIN — CEFTRIAXONE 1 G: 1 INJECTION, POWDER, FOR SOLUTION INTRAMUSCULAR; INTRAVENOUS at 00:33

## 2021-04-25 RX ADMIN — LEVOFLOXACIN 500 MG: 5 INJECTION, SOLUTION INTRAVENOUS at 06:15

## 2021-04-25 NOTE — PROGRESS NOTES
0730: report given to this nurse from [address] [place] [address],[address]: d/c order noted per MD Alireza Christiansen RN    4847: d/c instructions printed for review  Carlene Chambers RN    3632: d/c instructions reviewed with pt, all questions answered along with review on f/u appt, no c/o health concern at time of d/c  PADILLA Norton RN

## 2021-04-25 NOTE — DISCHARGE INSTRUCTIONS
DISCHARGE SUMMARY from Nurse    PATIENT INSTRUCTIONS:    After general anesthesia or intravenous sedation, for 24 hours or while taking prescription Narcotics:  · Limit your activities  · Do not drive and operate hazardous machinery  · Do not make important personal or business decisions  · Do  not drink alcoholic beverages  · If you have not urinated within 8 hours after discharge, please contact your doctor on call. Report the following to your doctor  · Temperature over 101.5  · Nausea and vomiting lasting longer than 6 hours or if unable to take medications  · Any signs of decreased circulation or nerve impairment to extremity: change in color, persistent  numbness, tingling, coldness or increase pain  · Any questions    What to do at Home:  Recommended activity: Activity as tolerated and no driving for today, no hard contact sports or lifting more than 30lbs prior to MD follow up    If you experience any of the following symptoms, please follow up with your doctor. *  Please give a list of your current medications to your Primary Care Provider. *  Please update this list whenever your medications are discontinued, doses are      changed, or new medications (including over-the-counter products) are added. *  Please carry medication information at all times in case of emergency situations. These are general instructions for a healthy lifestyle:    No smoking/ No tobacco products/ Avoid exposure to second hand smoke  Surgeon General's Warning:  Quitting smoking now greatly reduces serious risk to your health.     Obesity, smoking, and sedentary lifestyle greatly increases your risk for illness    A healthy diet, regular physical exercise & weight monitoring are important for maintaining a healthy lifestyle    You may be retaining fluid if you have a history of heart failure or if you experience any of the following symptoms:  Weight gain of 3 pounds or more overnight or 5 pounds in a week, increased swelling in our hands or feet or shortness of breath while lying flat in bed. Please call your doctor as soon as you notice any of these symptoms; do not wait until your next office visit. The discharge information has been reviewed with the patient. The patient verbalized understanding. Discharge medications reviewed with the patient and appropriate educational materials and side effects teaching were provided.   ___________________________________________________________________________________________________________________________________

## 2021-04-25 NOTE — DISCHARGE SUMMARY
Discharge Summary    Patient: Governor Escamilla MRN: [id number]  CSN: [id number]    YOB: 1960  Age: 61 y.o. Sex: female    DOA: 4/22/2021 LOS:  LOS: 2 days   Discharge Date:      Primary Care Provider:  Cinda Borden DO    Admission Diagnoses: CAP (community acquired pneumonia) [J18.9]  Ground glass opacity present on imaging of lung [R91.8]    Discharge Diagnoses:    Problem List as of 4/25/2021 Date Reviewed: 3/8/2016          Codes Class Noted - Resolved    Pneumonia ICD-10-CM: J18.9  ICD-9-CM: 486  4/23/2021 - Present        Obesity (BMI 30-39. 9) ICD-10-CM: E66.9  ICD-9-CM: 278.00  4/23/2021 - Present        Fall at home ICD-10-CM: [address] [facility]  ICD-9-CM: X8893752, E849.0  4/23/2021 - Present        Acquired right foot drop ICD-10-CM: M21.371  ICD-9-CM: 736.79  4/23/2021 - Present        * (Principal) Elevated troponin ICD-10-CM: R77.8  ICD-9-CM: 790.6  4/23/2021 - Present        Suspected COVID-19 virus infection ICD-10-CM: Z20.822  ICD-9-CM: V01.79  4/23/2021 - Present        Chronic pain syndrome (Chronic) ICD-10-CM: G89.4  ICD-9-CM: 338.4  2/3/2016 - Present        Chronic back pain (Chronic) ICD-10-CM: M54.9, G89.29  ICD-9-CM: 724.5, 338.29  2/3/2016 - Present        Paroxysmal atrial fibrillation (HCC) ICD-10-CM: I48.0  ICD-9-CM: 427.31  2/18/2015 - Present        RESOLVED: Ground glass opacity present on imaging of lung ICD-10-CM: R91.8  ICD-9-CM: 793.19  4/23/2021 - 4/23/2021        RESOLVED: Pneumonitis due to liquids (HCC) ICD-10-CM: J69.8  ICD-9-CM: 507.0  4/23/2021 - 4/23/2021        RESOLVED: Rectal perforation ([facility] [address] ICD-10-CM: K63.1  ICD-9-CM: 569.49  2/16/2015 - 4/23/2021        RESOLVED: Perforated rectum ([facility] [address] ICD-10-CM: K63.1  ICD-9-CM: 569.49  2/16/2015 - 4/23/2021              Discharge Medications:     Current Discharge Medication List          Discharge Condition:  stable        PHYSICAL EXAM   Visit Vitals  BP (!) 140/67 (BP 1 Location: Left upper arm, BP Patient Position: At rest)   Pulse 63   Temp 97.9 °F (36.6 °C)   Resp 18   Ht 5' 4\" (1.626 m)   Wt 99.5 kg (219 lb 4.8 oz)   SpO2 98%   BMI 37.64 kg/m²     General: Awake, cooperative, no acute distress    HEENT: NC, Atraumatic. PERRLA, EOMI. Anicteric sclerae. Lungs:  CTA Bilaterally. No Wheezing/Rhonchi/Rales. Heart:  Regular  rhythm,  No murmur, No Rubs, No Gallops  Abdomen: Soft, Non distended, Non tender. +Bowel sounds,   Extremities: No c/c/e  Psych:   Not anxious or agitated. Neurologic:  No acute neurological deficits. Admission HPI : Olena Guzman is a 61 y.o. female with chronic pain syndrome, paroxysmal atrial fibrillation, and obesity presents to the ED with worsening cough and shortness of breath since yesterday. She reports a years long history of dyspnea on exertion especially when climbing the stairs. She has not had recent cardiac stress testing. She denies any fever but has had cough and diarrhea. She has not received her Covid vaccines. She reports she had a severe pneumonia in 1975 that required hospital admission. She uses an inhaler as needed for shortness of breath with exertion but reports that her pulmonary work-up has been negative. She states that she has had multiple lung studies done but do not show evidence of asthma or COPD. 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 She does not usually wear a mask when using ammonia, and has been doing this for the past 1.5 years. She also reports that she has a chronic right foot drop and she caught her foot on a rug yesterday and fell. She does not usually use a walker around the house but sometimes uses a cane. She denies any bowel or bladder incontinence. Hospital Course : Patient was admitted and placed on isolation until Covid could be ruled out.   She was initially placed on IV antibiotics and actually did very well and became asymptomatic from a respiratory standpoint and wanted to go home. She indicates to me that the groundglass appearance was on previous CAT scans at [facility] and she is going to follow-up with a pulmonologist Robyn Valenzuela MD in [facility]. She did have a slight elevation of troponin on admission but that was very flat and she had no recurrence of chest pain she had an elevated D-dimer but her CT scan and other work-up was negative for blood clots. She was completely asymptomatic and would like to go home I feel like that safe we will place her on oral antibiotics and she will have close follow-up with her new primary to make sure she continues to do well. She will come back immediately if any recurring shortness of breath or chest pain. Activity: Stable  Diet:   Regular    Follow-up:  With new primary doctor and new pulmonologist as directed    Disposition: Home    Minutes spent on discharge: 35 minutes        Labs: Results:       Chemistry Recent Labs     04/25/21  0110 04/24/21  0425 04/22/21  2240   GLU 85 126* 142*    142 139   K 3.8 3.9 3.6    109 105   CO2 27 29 27   BUN 11 13 17   CREA 0.55* 0.54* 0.81   CA 8.6 8.5 9.1   AGAP 5 4 7   BUCR 20 24* 21*   AP 98 89 102   TP 5.9* 5.5* 6.3*   ALB 2.9* 2.7* 3.4   GLOB 3.0 2.8 2.9   AGRAT 1.0 1.0 1.2      CBC w/Diff Recent Labs     04/25/21  0110 04/24/21  0425 04/22/21  2240   WBC 5.1 6.4 8.7   RBC 3.55* 3.35* 3.99*   HGB 10.2* 9.8* 11.6*   HCT 33.5* 31.4* 36.2    129* 165   GRANS  --   --  82*   LYMPH  --   --  13*   EOS  --   --  0      Cardiac Enzymes Recent Labs     04/23/21  1615 04/23/21  1035   CPK 71 79   CKND1 1.5 1.9      Coagulation Recent Labs     04/22/21  2240   PTP 14.6   INR 1.2   APTT 31.3       Lipid Panel Lab Results   Component Value Date/Time    Cholesterol, total 180 04/23/2021 05:46 AM    HDL Cholesterol 103 (H) 04/23/2021 05:46 AM    LDL, calculated 67.6 04/23/2021 05:46 AM    VLDL, calculated 9.4 04/23/2021 05:46 AM    Triglyceride 47 04/23/2021 05:46 AM    CHOL/HDL Ratio 1.7 04/23/2021 05:46 AM      BNP No results for input(s): BNPP in the last 72 hours. Liver Enzymes Recent Labs     04/25/21  0110   TP 5.9*   ALB 2.9*   AP 98      Thyroid Studies Lab Results   Component Value Date/Time    TSH 4.15 (H) 02/18/2015 04:15 AM            Significant Diagnostic Studies: Cta Chest W Or W Wo Cont    Result Date: 4/23/2021  EXAM: CTA CHEST W OR W WO CONT CLINICAL INDICATION/HISTORY:  Shortness of breath -Additional: None COMPARISON: Same day radiograph TECHNIQUE: Helical CT imaging from the thoracic inlet through the diaphragm with intravenous contrast. Coronal and sagittal MIP reformats were generated. One or more dose reduction techniques were used on this CT: automated exposure control, adjustment of the mAs and/or kVp according to patient size, and iterative reconstruction techniques. The specific techniques used on this CT exam have been documented in the patient's electronic medical record. Digital Imaging and Communications in Medicine (DICOM) format image data are available to nonaffiliated external healthcare facilities or entities on a secure, media free, reciprocally searchable basis with patient authorization for at least a 12-month period after this study. _______________ FINDINGS: EXAM QUALITY: Adequate. PULMONARY ARTERIES: No pulmonary artery filling defects. Normal diameter of the main PA. MEDIASTINUM: Normal heart size. Aorta is unremarkable. No pericardial effusion. LUNGS: Regions of consolidation and groundglass opacification symmetrically affecting the mid and lower lungs. PLEURA: Normal. AIRWAY: Normal. LYMPH NODES:  No enlarged nodes. UPPER ABDOMEN: Evidence of prior gastric sleeve procedure. Otherwise unremarkable. OSSEOUS: No acute or aggressive osseous abnormalities identified. OTHER: None. _______________     No evidence of pulmonary embolism.  Lower lung consolidations and groundglass opacification consistent with multifocal pneumonia. Xr Chest Port    Result Date: 4/22/2021  EXAM: FRONTAL CHEST RADIOGRAPH CLINICAL INDICATION/HISTORY: Cough COMPARISON: 10/10/2010 TECHNIQUE: Frontal view of the chest _______________ FINDINGS: HEART AND MEDIASTINUM: Unremarkable. LUNGS AND PLEURAL SPACES: Heterogeneous opacities in both lung bases, more dense on the right. No visible pleural abnormalities. BONY THORAX AND SOFT TISSUES: Unremarkable. _______________     Right greater than left basilar pulmonary opacities, suspicious for pneumonia in the setting of cough. Echo Adult Complete    Result Date: 4/24/2021  · Left Ventricle: Normal cavity size, wall thickness and systolic function (ejection fraction normal). The estimated EF is 55 - 60%. There is mild (grade 1) left ventricular diastolic dysfunction E'E= 7.23. Wall Scoring: The left ventricular wall motion is normal. · Pulmonary Artery: Pulmonary arteries not well visualized. Pulmonary arterial systolic pressure (PASP) is 28 mmHg. Pulmonary hypertension not suggested by Doppler findings.                   CC: Louis Ruiz, DO